# Patient Record
Sex: FEMALE | Race: BLACK OR AFRICAN AMERICAN | NOT HISPANIC OR LATINO | Employment: OTHER | URBAN - METROPOLITAN AREA
[De-identification: names, ages, dates, MRNs, and addresses within clinical notes are randomized per-mention and may not be internally consistent; named-entity substitution may affect disease eponyms.]

---

## 2022-04-28 ENCOUNTER — TELEPHONE (OUTPATIENT)
Dept: ORTHOPEDICS | Facility: CLINIC | Age: 57
End: 2022-04-28
Payer: COMMERCIAL

## 2022-04-28 NOTE — TELEPHONE ENCOUNTER
----- Message from Alcira Hector LPN sent at 4/28/2022  8:49 AM CDT -----  Good morning,     Dr Flores's last day is tomorrow with [facility]. Can you please get this pt scheduled with a provider that treats left fibula fractures.     Thanks,   Alcira  ----- Message -----  From: Lulu Bello  Sent: 4/28/2022   8:11 AM CDT  To: Sandra Mares Staff    Same Day Appointment Request    Was an appointment with another provider offered?     Reason for FST appt.:Closed fracture of distal end of left fibula, unspecified fracture pt was told by the er doctor she had to have surgery and to follow up with Dr Flores pt is in pain crying on the phone   Communication Preference: can you please call pt at [phone]  Additional Information: none    KETAN

## 2022-05-03 ENCOUNTER — HOSPITAL ENCOUNTER (OUTPATIENT)
Dept: RADIOLOGY | Facility: HOSPITAL | Age: 57
Discharge: HOME | End: 2022-05-03
Attending: PHYSICIAN ASSISTANT
Payer: COMMERCIAL

## 2022-05-03 ENCOUNTER — HOSPITAL ENCOUNTER (OUTPATIENT)
Dept: CARDIOLOGY | Facility: CLINIC | Age: 57
Discharge: HOME | End: 2022-05-03
Payer: COMMERCIAL

## 2022-05-03 ENCOUNTER — OFFICE VISIT (OUTPATIENT)
Dept: ORTHOPEDICS | Facility: CLINIC | Age: 57
End: 2022-05-03
Payer: COMMERCIAL

## 2022-05-03 VITALS — HEART RATE: 91 BPM | DIASTOLIC BLOOD PRESSURE: 97 MMHG | SYSTOLIC BLOOD PRESSURE: 143 MMHG

## 2022-05-03 DIAGNOSIS — M25.552 LEFT HIP PAIN: Primary | ICD-10-CM

## 2022-05-03 DIAGNOSIS — Z01.818 PRE-OP TESTING: ICD-10-CM

## 2022-05-03 DIAGNOSIS — S82.832A CLOSED FRACTURE OF DISTAL END OF LEFT FIBULA, UNSPECIFIED FRACTURE MORPHOLOGY, INITIAL ENCOUNTER: ICD-10-CM

## 2022-05-03 DIAGNOSIS — S82.52XA CLOSED DISPLACED FRACTURE OF MEDIAL MALLEOLUS OF LEFT TIBIA, INITIAL ENCOUNTER: ICD-10-CM

## 2022-05-03 DIAGNOSIS — M25.552 LEFT HIP PAIN: ICD-10-CM

## 2022-05-03 DIAGNOSIS — S92.322A CLOSED DISPLACED FRACTURE OF SECOND METATARSAL BONE OF LEFT FOOT, INITIAL ENCOUNTER: ICD-10-CM

## 2022-05-03 PROCEDURE — 1159F MED LIST DOCD IN RCRD: CPT | Mod: CPTII,S$GLB,, | Performed by: PHYSICIAN ASSISTANT

## 2022-05-03 PROCEDURE — 3080F PR MOST RECENT DIASTOLIC BLOOD PRESSURE >= 90 MM HG: ICD-10-PCS | Mod: CPTII,S$GLB,, | Performed by: PHYSICIAN ASSISTANT

## 2022-05-03 PROCEDURE — 73502 X-RAY EXAM HIP UNI 2-3 VIEWS: CPT | Mod: TC,LT

## 2022-05-03 PROCEDURE — 99204 OFFICE O/P NEW MOD 45 MIN: CPT | Mod: S$GLB,,, | Performed by: PHYSICIAN ASSISTANT

## 2022-05-03 PROCEDURE — 73610 X-RAY EXAM OF ANKLE: CPT | Mod: TC,LT

## 2022-05-03 PROCEDURE — 99204 PR OFFICE/OUTPT VISIT, NEW, LEVL IV, 45-59 MIN: ICD-10-PCS | Mod: S$GLB,,, | Performed by: PHYSICIAN ASSISTANT

## 2022-05-03 PROCEDURE — 93005 EKG 12-LEAD: ICD-10-PCS | Mod: S$GLB,,, | Performed by: PHYSICIAN ASSISTANT

## 2022-05-03 PROCEDURE — 73502 XR HIP WITH PELVIS WHEN PERFORMED, 2 OR 3 VIEWS LEFT: ICD-10-PCS | Mod: 26,LT,, | Performed by: RADIOLOGY

## 2022-05-03 PROCEDURE — 71046 XR CHEST PA AND LATERAL: ICD-10-PCS | Mod: 26,,, | Performed by: RADIOLOGY

## 2022-05-03 PROCEDURE — 73502 X-RAY EXAM HIP UNI 2-3 VIEWS: CPT | Mod: 26,LT,, | Performed by: RADIOLOGY

## 2022-05-03 PROCEDURE — 93010 EKG 12-LEAD: ICD-10-PCS | Mod: S$GLB,,, | Performed by: INTERNAL MEDICINE

## 2022-05-03 PROCEDURE — 71046 X-RAY EXAM CHEST 2 VIEWS: CPT | Mod: 26,,, | Performed by: RADIOLOGY

## 2022-05-03 PROCEDURE — 3077F SYST BP >= 140 MM HG: CPT | Mod: CPTII,S$GLB,, | Performed by: PHYSICIAN ASSISTANT

## 2022-05-03 PROCEDURE — 3077F PR MOST RECENT SYSTOLIC BLOOD PRESSURE >= 140 MM HG: ICD-10-PCS | Mod: CPTII,S$GLB,, | Performed by: PHYSICIAN ASSISTANT

## 2022-05-03 PROCEDURE — 99999 PR PBB SHADOW E&M-EST. PATIENT-LVL V: CPT | Mod: PBBFAC,,, | Performed by: PHYSICIAN ASSISTANT

## 2022-05-03 PROCEDURE — 3080F DIAST BP >= 90 MM HG: CPT | Mod: CPTII,S$GLB,, | Performed by: PHYSICIAN ASSISTANT

## 2022-05-03 PROCEDURE — 73610 XR ANKLE COMPLETE 3 VIEW LEFT: ICD-10-PCS | Mod: 26,LT,, | Performed by: RADIOLOGY

## 2022-05-03 PROCEDURE — 71046 X-RAY EXAM CHEST 2 VIEWS: CPT | Mod: TC,FY

## 2022-05-03 PROCEDURE — 1159F PR MEDICATION LIST DOCUMENTED IN MEDICAL RECORD: ICD-10-PCS | Mod: CPTII,S$GLB,, | Performed by: PHYSICIAN ASSISTANT

## 2022-05-03 PROCEDURE — 93005 ELECTROCARDIOGRAM TRACING: CPT | Mod: S$GLB,,, | Performed by: PHYSICIAN ASSISTANT

## 2022-05-03 PROCEDURE — 99999 PR PBB SHADOW E&M-EST. PATIENT-LVL V: ICD-10-PCS | Mod: PBBFAC,,, | Performed by: PHYSICIAN ASSISTANT

## 2022-05-03 PROCEDURE — 93010 ELECTROCARDIOGRAM REPORT: CPT | Mod: S$GLB,,, | Performed by: INTERNAL MEDICINE

## 2022-05-03 PROCEDURE — 73610 X-RAY EXAM OF ANKLE: CPT | Mod: 26,LT,, | Performed by: RADIOLOGY

## 2022-05-03 RX ORDER — OXYCODONE HYDROCHLORIDE 5 MG/1
5 TABLET ORAL EVERY 4 HOURS PRN
Qty: 42 TABLET | Refills: 0 | Status: SHIPPED | OUTPATIENT
Start: 2022-05-03 | End: 2022-05-06 | Stop reason: SDUPTHER

## 2022-05-03 RX ORDER — METHOCARBAMOL 500 MG/1
500 TABLET, FILM COATED ORAL 3 TIMES DAILY
Qty: 42 TABLET | Refills: 0 | Status: SHIPPED | OUTPATIENT
Start: 2022-05-03 | End: 2022-05-06 | Stop reason: SDUPTHER

## 2022-05-04 ENCOUNTER — HOSPITAL ENCOUNTER (OUTPATIENT)
Dept: RADIOLOGY | Facility: HOSPITAL | Age: 57
Discharge: HOME | End: 2022-05-04
Attending: PHYSICIAN ASSISTANT
Payer: COMMERCIAL

## 2022-05-04 DIAGNOSIS — S82.52XA CLOSED DISPLACED FRACTURE OF MEDIAL MALLEOLUS OF LEFT TIBIA, INITIAL ENCOUNTER: ICD-10-CM

## 2022-05-04 DIAGNOSIS — S92.322A CLOSED DISPLACED FRACTURE OF SECOND METATARSAL BONE OF LEFT FOOT, INITIAL ENCOUNTER: ICD-10-CM

## 2022-05-04 DIAGNOSIS — S82.832A CLOSED FRACTURE OF DISTAL END OF LEFT FIBULA, UNSPECIFIED FRACTURE MORPHOLOGY, INITIAL ENCOUNTER: ICD-10-CM

## 2022-05-04 PROCEDURE — 73700 CT LOWER EXTREMITY W/O DYE: CPT | Mod: 26,LT,, | Performed by: RADIOLOGY

## 2022-05-04 PROCEDURE — 73700 CT FOOT WITHOUT CONTRAST LEFT: ICD-10-PCS | Mod: 26,LT,, | Performed by: RADIOLOGY

## 2022-05-04 PROCEDURE — 73700 CT LOWER EXTREMITY W/O DYE: CPT | Mod: TC,LT

## 2022-05-06 ENCOUNTER — TELEPHONE (OUTPATIENT)
Dept: ORTHOPEDICS | Facility: CLINIC | Age: 57
End: 2022-05-06
Payer: COMMERCIAL

## 2022-05-06 ENCOUNTER — ANESTHESIA EVENT (OUTPATIENT)
Dept: SURGERY | Facility: HOSPITAL | Age: 57
End: 2022-05-06
Payer: COMMERCIAL

## 2022-05-06 ENCOUNTER — TELEPHONE (OUTPATIENT)
Dept: ORTHOPEDICS | Facility: CLINIC | Age: 57
End: 2022-05-06

## 2022-05-06 ENCOUNTER — OFFICE VISIT (OUTPATIENT)
Dept: ORTHOPEDICS | Facility: CLINIC | Age: 57
End: 2022-05-06
Payer: COMMERCIAL

## 2022-05-06 DIAGNOSIS — S82.52XA CLOSED DISPLACED FRACTURE OF MEDIAL MALLEOLUS OF LEFT TIBIA, INITIAL ENCOUNTER: ICD-10-CM

## 2022-05-06 DIAGNOSIS — S82.832A CLOSED FRACTURE OF DISTAL END OF LEFT FIBULA, UNSPECIFIED FRACTURE MORPHOLOGY, INITIAL ENCOUNTER: Primary | ICD-10-CM

## 2022-05-06 PROCEDURE — 99499 UNLISTED E&M SERVICE: CPT | Mod: S$GLB,,, | Performed by: PHYSICIAN ASSISTANT

## 2022-05-06 PROCEDURE — 1159F PR MEDICATION LIST DOCUMENTED IN MEDICAL RECORD: ICD-10-PCS | Mod: CPTII,S$GLB,, | Performed by: PHYSICIAN ASSISTANT

## 2022-05-06 PROCEDURE — 1159F MED LIST DOCD IN RCRD: CPT | Mod: CPTII,S$GLB,, | Performed by: PHYSICIAN ASSISTANT

## 2022-05-06 PROCEDURE — 99499 NO LOS: ICD-10-PCS | Mod: S$GLB,,, | Performed by: PHYSICIAN ASSISTANT

## 2022-05-06 PROCEDURE — 99999 PR PBB SHADOW E&M-EST. PATIENT-LVL II: ICD-10-PCS | Mod: PBBFAC,,, | Performed by: PHYSICIAN ASSISTANT

## 2022-05-06 PROCEDURE — 99999 PR PBB SHADOW E&M-EST. PATIENT-LVL II: CPT | Mod: PBBFAC,,, | Performed by: PHYSICIAN ASSISTANT

## 2022-05-06 RX ORDER — METHOCARBAMOL 500 MG/1
500 TABLET, FILM COATED ORAL 3 TIMES DAILY
Qty: 42 TABLET | Refills: 0 | Status: SHIPPED | OUTPATIENT
Start: 2022-05-06 | End: 2022-05-11 | Stop reason: SDUPTHER

## 2022-05-06 RX ORDER — MIDAZOLAM HYDROCHLORIDE 1 MG/ML
.5-4 INJECTION INTRAMUSCULAR; INTRAVENOUS
Status: CANCELLED | OUTPATIENT
Start: 2022-05-06

## 2022-05-06 RX ORDER — FENTANYL CITRATE 50 UG/ML
25-200 INJECTION, SOLUTION INTRAMUSCULAR; INTRAVENOUS
Status: CANCELLED | OUTPATIENT
Start: 2022-05-06

## 2022-05-06 RX ORDER — OXYCODONE HYDROCHLORIDE 5 MG/1
5 TABLET ORAL EVERY 4 HOURS PRN
Qty: 42 TABLET | Refills: 0 | Status: SHIPPED | OUTPATIENT
Start: 2022-05-06 | End: 2022-05-11 | Stop reason: SDUPTHER

## 2022-05-06 NOTE — TELEPHONE ENCOUNTER
"----- Message from Shanna Pineda sent at 5/6/2022  2:17 PM CDT -----  "Type:  Patient Call Back    Who Called:RAMYA MCGILL [[id number]]    What is the reqeust in detail:Pt is waiting at pharmacy for medication. Please advise    Can the clinic reply by [facility]?no    Best Call Back Number:[phone]      Additional Information:Please give pt an call. Pt was seen today            "

## 2022-05-06 NOTE — H&P
Subjective:      Patient ID: Sarthak Branham is a 56 y.o. female.    Chief Complaint: Pain and Injury of the Left Lower Leg, Injury and Pain of the Left Ankle, Injury and Pain of the Left Foot, and Pain and Injury of the Left Hip    Principal Orthopedic Problem: left trimal ankle fracture            Left 2,3,4 metatarsal fracture              Right ankle sprain.      Relevant Medical History: according to chart    PMHX: HTN    Lives at home with family  Prior to injury  Independent community ambulator, gait aids   Denies history of stroke, heart attack, cancer, blood clot, diabetes  Denies tobacco use  Denied chronic pain medication use prior to injury  Allergic to tylenol and hx of GI bleed 2019 need to avoid NSAID.    HPI     Patient is a 56 year old female who presented to the ED on 04/27/22 with left ankle pain after injuring her bilateral ankle.   RADS:   RIGHT: no fracture or dislocation  LEFT ANKLE: trimalleolar ankle fracture  LEFT FOOT: 2,3,4 metatarsal fracture.   Patient left ankle was treated in a posterior splint with stirup.    Past Medical History:   Diagnosis Date    Essential (primary) hypertension      Past Surgical History:   Procedure Laterality Date    KNEE SURGERY Bilateral     TUBAL LIGATION Left      Social History     Occupational History    Not on file   Tobacco Use    Smoking status: Former Smoker    Smokeless tobacco: Never Used   Substance and Sexual Activity    Alcohol use: Yes     Comment: socially    Drug use: Yes     Types: Marijuana    Sexual activity: Not on file      ROS  Current Outpatient Medications on File Prior to Visit   Medication Sig Dispense Refill    amLODIPine (NORVASC) 10 MG tablet Take 10 mg by mouth once daily.       No current facility-administered medications on file prior to visit.     Review of patient's allergies indicates:   Allergen Reactions    Penicillins Swelling    Aspirin Other (See Comments)     bleeding    Tylenol [acetaminophen] Hives          Objective:      Vitals:    05/03/22 1424   BP: (!) 143/97   BP Location: Left arm   Patient Position: Sitting   BP Method: X-Large (Automatic)   Pulse: 91     Ortho Exam     Gen: WD, WN in NAD   HEENT: NC/AT   Heart: RR   Resp: unlabored breathing   Skin: intact, no lesions pertinent to the surgery site    Assessment:       1. Left hip pain    2. Closed fracture of distal end of left fibula, unspecified fracture morphology, initial encounter    3. Closed displaced fracture of medial malleolus of left tibia, initial encounter    4. Closed displaced fracture of second metatarsal bone of left foot, initial encounter    5. Pre-op testing          Plan:       Surgical intervention per .

## 2022-05-06 NOTE — TELEPHONE ENCOUNTER
Spoke with pt. Pt is aware of Rx and when to  the prescription. Patient states verbal understanding and has no further questions.

## 2022-05-06 NOTE — PROGRESS NOTES
Subjective:      Patient ID: Sarthak Branham is a 56 y.o. female.    Chief Complaint: Pain and Injury of the Left Lower Leg, Injury and Pain of the Left Ankle, Injury and Pain of the Left Foot, and Pain and Injury of the Left Hip    Principal Orthopedic Problem: left trimal ankle fracture            Left 2,3,4 metatarsal fracture              Right ankle sprain.      Relevant Medical History: according to chart    PMHX: HTN    Lives at home with family  Prior to injury  Independent community ambulator, gait aids   Denies history of stroke, heart attack, cancer, blood clot, diabetes  Denies tobacco use  Denied chronic pain medication use prior to injury  Allergic to tylenol and hx of GI bleed 2019 need to avoid NSAID.    HPI     Patient is a 56 year old female who presented to the ED on 04/27/22 with left ankle pain after injuring her bilateral ankle.   RADS:   RIGHT: no fracture or dislocation  LEFT ANKLE: trimalleolar ankle fracture  LEFT FOOT: 2,3,4 metatarsal fracture.   Patient left ankle was treated in a posterior splint with stirup. She has been NWB. She reports pain is somewhat controlled. Denied numbness or tingling.     She reports that she is having some left hip pain also. Pain is mainly in her back area. No groin pain.     Review of Systems   Constitutional: Negative for chills and fever.   Cardiovascular: Negative for chest pain.   Respiratory: Negative for cough and shortness of breath.    Skin: Negative for color change, dry skin, itching, nail changes, poor wound healing and rash.   Musculoskeletal: Positive for falls.        Left ankle fracture   Neurological: Negative for dizziness.   Psychiatric/Behavioral: Negative for altered mental status. The patient is not nervous/anxious.    All other systems reviewed and are negative.        Objective:        General    Constitutional: She is oriented to person, place, and time. She appears well-developed and well-nourished. No distress.   HENT:   Head:  Atraumatic.   Eyes: Conjunctivae are normal.   Cardiovascular: Normal rate.    Pulmonary/Chest: Effort normal.   Neurological: She is alert and oriented to person, place, and time.   Psychiatric: She has a normal mood and affect. Her behavior is normal.         Right Ankle/Foot Exam     Comments:  Patient presented to clinic in a wheelchair with splint in place.   Moderate swelling, skin does wrinkle.         RADS: reviewed by myself:  ANKLE: There is a comminuted distal fibular fracture.  There is a fracture of the medial malleolus.  There is mild lateral subluxation of the talus.  There are fractures of the proximal 2nd 3rd and 4th metatarsals.    HIP: No fracture dislocation bone destruction seen.          Assessment:       Encounter Diagnoses   Name Primary?    Closed fracture of distal end of left fibula, unspecified fracture morphology, initial encounter     Closed displaced fracture of medial malleolus of left tibia, initial encounter     Closed displaced fracture of second metatarsal bone of left foot, initial encounter     Left hip pain Yes    Pre-op testing           Plan:       Discussed treatment options with patient.  RIGHT ANKLE: continue non-operative treatment RICE for symptoms relief.     LEFT FOOT: non operative treatment, continue splint, NWB    orderd CT : after review no concern for lis franc injury will continue splint and NWB due to ankle injury.  No operative intervention needed.    LEFT ANKLE:    Operative vs non-operative treatment discussed with patient. Recommend operative fixation. Patient agreed.  PLAN is CT for futher surgical planning., will continue splint and NWB.   Plan ORIF on 05/09/22 pending swelling.      - rest ice and elevation to reduce swelling.    Discussed proper and consistent elevation of extremities, above the level of the heart, while at rest, to help control/improve edema and decrease pain.  - NWB,   - Pain medication: refill needed, started on multimodal, but  need to avoid Tylenol and NSAIDS.    - Discussed pain medication refill policy.      - consents signed,   - lab, chest x-ray and EKG ordered  , results in chart  - not taking blood thinners       -Discussed COVID19 with the patient, they are aware of our current policies and procedures, were given the option of delaying surgery, and they elect to proceed. Covid testing to be done 48 hours prior to surgery.    - Vaccinated     Pre, sahil, and post operative procedure and expectations were discussed.  Questions were answered. The patient has been educated and is ready to proceed with surgery.  Approximately 30 minutes was spent discussing surgical outcomes, plans, procedures, pre, sahil, and post operative expectations and care. The risks, benefits and alternatives to surgery were discussed with the patient at great length.  These include bleeding, infection, vessel/nerve damage, pain, numbness, tingling, complex regional pain syndrome, hardware/surgical failure, need for further surgery, malunion, nonunion, DVT, PE, arthritis and death. He also understands that the risks of surgery may be greater for some patients due to health or lifestyle issues, such as a current condition or a history of heart disease, obesity, clotting disorders, recurrent infections, smoking, sedentary lifestyle, or noncompliance with medications, therapy, or followup. The degree of the increased risk is hard to estimate with any degree of precision.  Patient states an understanding and wishes to proceed with surgery.   All questions were answered.  No guarantees were implied or stated.  Informed consent was obtained  The patient will contact us if the have any questions, concerns, and changes in their medical condition prior to surgery.

## 2022-05-06 NOTE — TELEPHONE ENCOUNTER
Spoke with pt. Pt state that she will be late for appointment 5/6/22 @ 11:30 am. Pt is aware to come to her appointment on 5/6/22 @ 1300. Patient states verbal understanding and has no further questions.

## 2022-05-06 NOTE — PROGRESS NOTES
Patient presented to clinic for a skin check for upcomming surgery, but once splint was removed fracture blisters on both the medial and lateral sides were noted. Will postpone surgery date. return to clinic in week for skin check.

## 2022-05-09 ENCOUNTER — ANESTHESIA (OUTPATIENT)
Dept: SURGERY | Facility: HOSPITAL | Age: 57
End: 2022-05-09
Payer: COMMERCIAL

## 2022-05-10 ENCOUNTER — TELEPHONE (OUTPATIENT)
Dept: ORTHOPEDICS | Facility: CLINIC | Age: 57
End: 2022-05-10
Payer: COMMERCIAL

## 2022-05-10 NOTE — TELEPHONE ENCOUNTER
----- Message from Audrey Valadez PA-C sent at 5/10/2022 11:08 AM CDT -----  Contact: Patient    ----- Message -----  From: Radha Santiago MA  Sent: 5/10/2022  10:02 AM CDT  To: Audrey Valadez PA-C    Pt had sx on 5/9/22. Will pt next appointment be in 2wks?  ----- Message -----  From: Anai Lee  Sent: 5/10/2022   9:55 AM CDT  To: Rory Ventura Staff    Type: Patient Call Back         Who called: Patient         What is the request in detail: calling to resched pre op appt for tmrw; states her sisters appt is from 8-11:30am; wanted to see if anything later may be avail or to resched for the next best day; please advise      Best call back number: [phone]         Additional Information: prefers tmrw ; anything after 12pm           Thank You

## 2022-05-11 ENCOUNTER — HOSPITAL ENCOUNTER (OUTPATIENT)
Dept: RADIOLOGY | Facility: HOSPITAL | Age: 57
Discharge: HOME | End: 2022-05-11
Attending: PHYSICIAN ASSISTANT
Payer: COMMERCIAL

## 2022-05-11 ENCOUNTER — OFFICE VISIT (OUTPATIENT)
Dept: ORTHOPEDICS | Facility: CLINIC | Age: 57
End: 2022-05-11
Payer: COMMERCIAL

## 2022-05-11 DIAGNOSIS — S82.832A CLOSED FRACTURE OF DISTAL END OF LEFT FIBULA, UNSPECIFIED FRACTURE MORPHOLOGY, INITIAL ENCOUNTER: Primary | ICD-10-CM

## 2022-05-11 DIAGNOSIS — M25.562 ACUTE PAIN OF LEFT KNEE: ICD-10-CM

## 2022-05-11 DIAGNOSIS — S82.832A CLOSED FRACTURE OF DISTAL END OF LEFT FIBULA, UNSPECIFIED FRACTURE MORPHOLOGY, INITIAL ENCOUNTER: ICD-10-CM

## 2022-05-11 DIAGNOSIS — S82.52XA CLOSED DISPLACED FRACTURE OF MEDIAL MALLEOLUS OF LEFT TIBIA, INITIAL ENCOUNTER: ICD-10-CM

## 2022-05-11 PROCEDURE — 73562 X-RAY EXAM OF KNEE 3: CPT | Mod: 26,LT,, | Performed by: RADIOLOGY

## 2022-05-11 PROCEDURE — 99999 PR PBB SHADOW E&M-EST. PATIENT-LVL III: CPT | Mod: PBBFAC,,, | Performed by: PHYSICIAN ASSISTANT

## 2022-05-11 PROCEDURE — 73610 X-RAY EXAM OF ANKLE: CPT | Mod: 26,LT,, | Performed by: RADIOLOGY

## 2022-05-11 PROCEDURE — 99499 UNLISTED E&M SERVICE: CPT | Mod: S$GLB,,, | Performed by: PHYSICIAN ASSISTANT

## 2022-05-11 PROCEDURE — 99999 PR PBB SHADOW E&M-EST. PATIENT-LVL III: ICD-10-PCS | Mod: PBBFAC,,, | Performed by: PHYSICIAN ASSISTANT

## 2022-05-11 PROCEDURE — 1159F MED LIST DOCD IN RCRD: CPT | Mod: CPTII,S$GLB,, | Performed by: PHYSICIAN ASSISTANT

## 2022-05-11 PROCEDURE — 1159F PR MEDICATION LIST DOCUMENTED IN MEDICAL RECORD: ICD-10-PCS | Mod: CPTII,S$GLB,, | Performed by: PHYSICIAN ASSISTANT

## 2022-05-11 PROCEDURE — 73610 XR ANKLE COMPLETE 3 VIEW LEFT: ICD-10-PCS | Mod: 26,LT,, | Performed by: RADIOLOGY

## 2022-05-11 PROCEDURE — 73562 X-RAY EXAM OF KNEE 3: CPT | Mod: TC,LT

## 2022-05-11 PROCEDURE — 99499 NO LOS: ICD-10-PCS | Mod: S$GLB,,, | Performed by: PHYSICIAN ASSISTANT

## 2022-05-11 PROCEDURE — 73562 XR KNEE 3 VIEW LEFT: ICD-10-PCS | Mod: 26,LT,, | Performed by: RADIOLOGY

## 2022-05-11 PROCEDURE — 73610 X-RAY EXAM OF ANKLE: CPT | Mod: TC,LT

## 2022-05-11 RX ORDER — OXYCODONE HYDROCHLORIDE 5 MG/1
5 TABLET ORAL EVERY 4 HOURS PRN
Qty: 42 TABLET | Refills: 0 | Status: ON HOLD | OUTPATIENT
Start: 2022-05-11 | End: 2022-05-17 | Stop reason: SDUPTHER

## 2022-05-11 RX ORDER — METHOCARBAMOL 500 MG/1
500 TABLET, FILM COATED ORAL 3 TIMES DAILY
Qty: 42 TABLET | Refills: 0 | Status: SHIPPED | OUTPATIENT
Start: 2022-05-11 | End: 2022-05-25 | Stop reason: SDUPTHER

## 2022-05-17 ENCOUNTER — HOSPITAL ENCOUNTER (OUTPATIENT)
Facility: HOSPITAL | Age: 57
Discharge: HOME | End: 2022-05-17
Attending: ORTHOPAEDIC SURGERY | Admitting: ORTHOPAEDIC SURGERY
Payer: COMMERCIAL

## 2022-05-17 ENCOUNTER — OFFICE VISIT (OUTPATIENT)
Dept: ORTHOPEDICS | Facility: CLINIC | Age: 57
End: 2022-05-17
Payer: COMMERCIAL

## 2022-05-17 VITALS
OXYGEN SATURATION: 100 % | HEIGHT: 65 IN | SYSTOLIC BLOOD PRESSURE: 130 MMHG | WEIGHT: 180 LBS | DIASTOLIC BLOOD PRESSURE: 67 MMHG | BODY MASS INDEX: 29.99 KG/M2 | RESPIRATION RATE: 18 BRPM | TEMPERATURE: 98 F | HEART RATE: 77 BPM

## 2022-05-17 DIAGNOSIS — S82.832A CLOSED FRACTURE OF DISTAL END OF LEFT FIBULA, UNSPECIFIED FRACTURE MORPHOLOGY, INITIAL ENCOUNTER: Primary | ICD-10-CM

## 2022-05-17 DIAGNOSIS — S82.852A CLOSED DISPLACED TRIMALLEOLAR FRACTURE OF LEFT ANKLE: ICD-10-CM

## 2022-05-17 PROCEDURE — 25000003 PHARM REV CODE 250: Performed by: NURSE ANESTHETIST, CERTIFIED REGISTERED

## 2022-05-17 PROCEDURE — 71000016 HC POSTOP RECOV ADDL HR: Performed by: ORTHOPAEDIC SURGERY

## 2022-05-17 PROCEDURE — 99999 PR PBB SHADOW E&M-EST. PATIENT-LVL II: CPT | Mod: PBBFAC,,, | Performed by: PHYSICIAN ASSISTANT

## 2022-05-17 PROCEDURE — 64445 LEFT POPLITEAL SCIATIC SINGLE INJECTION BLOCK: ICD-10-PCS | Mod: 59,LT,, | Performed by: ANESTHESIOLOGY

## 2022-05-17 PROCEDURE — C1769 GUIDE WIRE: HCPCS | Performed by: ORTHOPAEDIC SURGERY

## 2022-05-17 PROCEDURE — 99499 NO LOS: ICD-10-PCS | Mod: S$GLB,,, | Performed by: PHYSICIAN ASSISTANT

## 2022-05-17 PROCEDURE — D9220A PRA ANESTHESIA: Mod: CRNA,,, | Performed by: NURSE ANESTHETIST, CERTIFIED REGISTERED

## 2022-05-17 PROCEDURE — 27201423 OPTIME MED/SURG SUP & DEVICES STERILE SUPPLY: Performed by: ORTHOPAEDIC SURGERY

## 2022-05-17 PROCEDURE — 64447 NJX AA&/STRD FEMORAL NRV IMG: CPT | Mod: 59,LT,, | Performed by: ANESTHESIOLOGY

## 2022-05-17 PROCEDURE — 99499 UNLISTED E&M SERVICE: CPT | Mod: S$GLB,,, | Performed by: PHYSICIAN ASSISTANT

## 2022-05-17 PROCEDURE — 99999 PR PBB SHADOW E&M-EST. PATIENT-LVL II: ICD-10-PCS | Mod: PBBFAC,,, | Performed by: PHYSICIAN ASSISTANT

## 2022-05-17 PROCEDURE — 76942 PR U/S GUIDANCE FOR NEEDLE GUIDANCE: ICD-10-PCS | Mod: 26,,, | Performed by: ANESTHESIOLOGY

## 2022-05-17 PROCEDURE — D9220A PRA ANESTHESIA: Mod: ANES,,, | Performed by: ANESTHESIOLOGY

## 2022-05-17 PROCEDURE — 64447 LEFT ADDUCTOR CANAL SINGLE INJECTION BLOCK: ICD-10-PCS | Mod: 59,LT,, | Performed by: ANESTHESIOLOGY

## 2022-05-17 PROCEDURE — 27823 TREATMENT OF ANKLE FRACTURE: CPT | Mod: LT,,, | Performed by: ORTHOPAEDIC SURGERY

## 2022-05-17 PROCEDURE — 63600175 PHARM REV CODE 636 W HCPCS: Performed by: NURSE ANESTHETIST, CERTIFIED REGISTERED

## 2022-05-17 PROCEDURE — 37000009 HC ANESTHESIA EA ADD 15 MINS: Performed by: ORTHOPAEDIC SURGERY

## 2022-05-17 PROCEDURE — 25000003 PHARM REV CODE 250: Performed by: STUDENT IN AN ORGANIZED HEALTH CARE EDUCATION/TRAINING PROGRAM

## 2022-05-17 PROCEDURE — 64445 NJX AA&/STRD SCIATIC NRV IMG: CPT | Mod: 59,LT,, | Performed by: ANESTHESIOLOGY

## 2022-05-17 PROCEDURE — D9220A PRA ANESTHESIA: ICD-10-PCS | Mod: CRNA,,, | Performed by: NURSE ANESTHETIST, CERTIFIED REGISTERED

## 2022-05-17 PROCEDURE — D9220A PRA ANESTHESIA: ICD-10-PCS | Mod: ANES,,, | Performed by: ANESTHESIOLOGY

## 2022-05-17 PROCEDURE — 71000015 HC POSTOP RECOV 1ST HR: Performed by: ORTHOPAEDIC SURGERY

## 2022-05-17 PROCEDURE — 27823 PR OPEN TX TRIMALLEOLAR ANKLE FX W FIX PST LIP: ICD-10-PCS | Mod: LT,,, | Performed by: ORTHOPAEDIC SURGERY

## 2022-05-17 PROCEDURE — 37000008 HC ANESTHESIA 1ST 15 MINUTES: Performed by: ORTHOPAEDIC SURGERY

## 2022-05-17 PROCEDURE — 36000708 HC OR TIME LEV III 1ST 15 MIN: Performed by: ORTHOPAEDIC SURGERY

## 2022-05-17 PROCEDURE — C1713 ANCHOR/SCREW BN/BN,TIS/BN: HCPCS | Performed by: ORTHOPAEDIC SURGERY

## 2022-05-17 PROCEDURE — 36000709 HC OR TIME LEV III EA ADD 15 MIN: Performed by: ORTHOPAEDIC SURGERY

## 2022-05-17 PROCEDURE — 25000003 PHARM REV CODE 250: Performed by: ANESTHESIOLOGY

## 2022-05-17 PROCEDURE — 76942 ECHO GUIDE FOR BIOPSY: CPT | Mod: 26,,, | Performed by: ANESTHESIOLOGY

## 2022-05-17 PROCEDURE — 71000033 HC RECOVERY, INTIAL HOUR: Performed by: ORTHOPAEDIC SURGERY

## 2022-05-17 PROCEDURE — 94761 N-INVAS EAR/PLS OXIMETRY MLT: CPT

## 2022-05-17 DEVICE — SCREW CORT SELF TAP 3.5X120MM: Type: IMPLANTABLE DEVICE | Site: ANKLE | Status: FUNCTIONAL

## 2022-05-17 DEVICE — IMPLANTABLE DEVICE: Type: IMPLANTABLE DEVICE | Site: ANKLE | Status: FUNCTIONAL

## 2022-05-17 RX ORDER — ACETAMINOPHEN 325 MG/1
650 TABLET ORAL EVERY 4 HOURS PRN
Status: DISCONTINUED | OUTPATIENT
Start: 2022-05-17 | End: 2022-05-17 | Stop reason: HOSPADM

## 2022-05-17 RX ORDER — PROPOFOL 10 MG/ML
VIAL (ML) INTRAVENOUS
Status: DISCONTINUED | OUTPATIENT
Start: 2022-05-17 | End: 2022-05-17

## 2022-05-17 RX ORDER — OXYCODONE HYDROCHLORIDE 5 MG/1
5 TABLET ORAL EVERY 6 HOURS PRN
Qty: 42 TABLET | Refills: 0 | Status: SHIPPED | OUTPATIENT
Start: 2022-05-17 | End: 2022-05-20 | Stop reason: SDUPTHER

## 2022-05-17 RX ORDER — LIDOCAINE HYDROCHLORIDE 10 MG/ML
1 INJECTION, SOLUTION EPIDURAL; INFILTRATION; INTRACAUDAL; PERINEURAL ONCE AS NEEDED
Status: DISCONTINUED | OUTPATIENT
Start: 2022-05-17 | End: 2022-05-17 | Stop reason: HOSPADM

## 2022-05-17 RX ORDER — HYDROMORPHONE HYDROCHLORIDE 2 MG/ML
INJECTION, SOLUTION INTRAMUSCULAR; INTRAVENOUS; SUBCUTANEOUS
Status: DISCONTINUED | OUTPATIENT
Start: 2022-05-17 | End: 2022-05-17

## 2022-05-17 RX ORDER — ACETAMINOPHEN 10 MG/ML
INJECTION, SOLUTION INTRAVENOUS
Status: DISCONTINUED | OUTPATIENT
Start: 2022-05-17 | End: 2022-05-17

## 2022-05-17 RX ORDER — ROCURONIUM BROMIDE 10 MG/ML
INJECTION, SOLUTION INTRAVENOUS
Status: DISCONTINUED | OUTPATIENT
Start: 2022-05-17 | End: 2022-05-17

## 2022-05-17 RX ORDER — ONDANSETRON 2 MG/ML
INJECTION INTRAMUSCULAR; INTRAVENOUS
Status: DISCONTINUED | OUTPATIENT
Start: 2022-05-17 | End: 2022-05-17

## 2022-05-17 RX ORDER — BUPIVACAINE HYDROCHLORIDE 2.5 MG/ML
INJECTION, SOLUTION EPIDURAL; INFILTRATION; INTRACAUDAL
Status: COMPLETED | OUTPATIENT
Start: 2022-05-17 | End: 2022-05-17

## 2022-05-17 RX ORDER — SODIUM CHLORIDE 9 MG/ML
INJECTION, SOLUTION INTRAVENOUS CONTINUOUS
Status: DISCONTINUED | OUTPATIENT
Start: 2022-05-17 | End: 2022-05-17 | Stop reason: HOSPADM

## 2022-05-17 RX ORDER — KETAMINE HCL IN 0.9 % NACL 50 MG/5 ML
SYRINGE (ML) INTRAVENOUS
Status: DISCONTINUED | OUTPATIENT
Start: 2022-05-17 | End: 2022-05-17

## 2022-05-17 RX ORDER — FENTANYL CITRATE 50 UG/ML
INJECTION, SOLUTION INTRAMUSCULAR; INTRAVENOUS
Status: DISCONTINUED | OUTPATIENT
Start: 2022-05-17 | End: 2022-05-17

## 2022-05-17 RX ORDER — ONDANSETRON 2 MG/ML
8 INJECTION INTRAMUSCULAR; INTRAVENOUS EVERY 12 HOURS PRN
Status: DISCONTINUED | OUTPATIENT
Start: 2022-05-17 | End: 2022-05-17 | Stop reason: HOSPADM

## 2022-05-17 RX ORDER — HYDROMORPHONE HYDROCHLORIDE 1 MG/ML
0.2 INJECTION, SOLUTION INTRAMUSCULAR; INTRAVENOUS; SUBCUTANEOUS EVERY 5 MIN PRN
Status: DISCONTINUED | OUTPATIENT
Start: 2022-05-17 | End: 2022-05-17 | Stop reason: HOSPADM

## 2022-05-17 RX ORDER — OXYCODONE HYDROCHLORIDE 5 MG/1
15 TABLET ORAL EVERY 4 HOURS PRN
Status: DISCONTINUED | OUTPATIENT
Start: 2022-05-17 | End: 2022-05-17 | Stop reason: HOSPADM

## 2022-05-17 RX ORDER — MIDAZOLAM HYDROCHLORIDE 1 MG/ML
INJECTION, SOLUTION INTRAMUSCULAR; INTRAVENOUS
Status: DISCONTINUED | OUTPATIENT
Start: 2022-05-17 | End: 2022-05-17

## 2022-05-17 RX ORDER — LIDOCAINE HYDROCHLORIDE 20 MG/ML
INJECTION INTRAVENOUS
Status: DISCONTINUED | OUTPATIENT
Start: 2022-05-17 | End: 2022-05-17

## 2022-05-17 RX ORDER — NEOSTIGMINE METHYLSULFATE 0.5 MG/ML
INJECTION, SOLUTION INTRAVENOUS
Status: DISCONTINUED | OUTPATIENT
Start: 2022-05-17 | End: 2022-05-17

## 2022-05-17 RX ORDER — OXYCODONE HYDROCHLORIDE 5 MG/1
5 TABLET ORAL EVERY 4 HOURS PRN
Status: DISCONTINUED | OUTPATIENT
Start: 2022-05-17 | End: 2022-05-17 | Stop reason: HOSPADM

## 2022-05-17 RX ORDER — SODIUM CHLORIDE 0.9 % (FLUSH) 0.9 %
3 SYRINGE (ML) INJECTION
Status: DISCONTINUED | OUTPATIENT
Start: 2022-05-17 | End: 2022-05-17 | Stop reason: HOSPADM

## 2022-05-17 RX ORDER — CLINDAMYCIN PHOSPHATE 900 MG/50ML
INJECTION, SOLUTION INTRAVENOUS
Status: DISCONTINUED | OUTPATIENT
Start: 2022-05-17 | End: 2022-05-17

## 2022-05-17 RX ORDER — CIPROFLOXACIN HYDROCHLORIDE 3 MG/ML
1 SOLUTION/ DROPS OPHTHALMIC EVERY 6 HOURS
Status: DISCONTINUED | OUTPATIENT
Start: 2022-05-17 | End: 2022-05-17 | Stop reason: HOSPADM

## 2022-05-17 RX ORDER — DEXAMETHASONE SODIUM PHOSPHATE 4 MG/ML
INJECTION, SOLUTION INTRA-ARTICULAR; INTRALESIONAL; INTRAMUSCULAR; INTRAVENOUS; SOFT TISSUE
Status: DISCONTINUED | OUTPATIENT
Start: 2022-05-17 | End: 2022-05-17

## 2022-05-17 RX ADMIN — BUPIVACAINE HYDROCHLORIDE 30 ML: 2.5 INJECTION, SOLUTION EPIDURAL; INFILTRATION; INTRACAUDAL; PERINEURAL at 12:05

## 2022-05-17 RX ADMIN — FENTANYL CITRATE 100 MCG: 50 INJECTION, SOLUTION INTRAMUSCULAR; INTRAVENOUS at 10:05

## 2022-05-17 RX ADMIN — MIDAZOLAM HYDROCHLORIDE 2 MG: 1 INJECTION, SOLUTION INTRAMUSCULAR; INTRAVENOUS at 10:05

## 2022-05-17 RX ADMIN — SODIUM CHLORIDE: 0.9 INJECTION, SOLUTION INTRAVENOUS at 10:05

## 2022-05-17 RX ADMIN — DEXAMETHASONE SODIUM PHOSPHATE 4 MG: 4 INJECTION, SOLUTION INTRAMUSCULAR; INTRAVENOUS at 10:05

## 2022-05-17 RX ADMIN — LIDOCAINE HYDROCHLORIDE 100 MG: 20 INJECTION, SOLUTION INTRAVENOUS at 10:05

## 2022-05-17 RX ADMIN — NEOSTIGMINE METHYLSULFATE 5 MG: 0.5 INJECTION INTRAVENOUS at 12:05

## 2022-05-17 RX ADMIN — OXYCODONE 5 MG: 5 TABLET ORAL at 03:05

## 2022-05-17 RX ADMIN — HYDROMORPHONE HYDROCHLORIDE 0.4 MG: 2 INJECTION INTRAMUSCULAR; INTRAVENOUS; SUBCUTANEOUS at 12:05

## 2022-05-17 RX ADMIN — ACETAMINOPHEN 1000 MG: 10 INJECTION, SOLUTION INTRAVENOUS at 10:05

## 2022-05-17 RX ADMIN — SODIUM CHLORIDE, SODIUM GLUCONATE, SODIUM ACETATE, POTASSIUM CHLORIDE, MAGNESIUM CHLORIDE, SODIUM PHOSPHATE, DIBASIC, AND POTASSIUM PHOSPHATE: .53; .5; .37; .037; .03; .012; .00082 INJECTION, SOLUTION INTRAVENOUS at 11:05

## 2022-05-17 RX ADMIN — GLYCOPYRROLATE 0.6 MG: 0.2 INJECTION, SOLUTION INTRAMUSCULAR; INTRAVITREAL at 12:05

## 2022-05-17 RX ADMIN — ROCURONIUM BROMIDE 50 MG: 10 INJECTION, SOLUTION INTRAVENOUS at 10:05

## 2022-05-17 RX ADMIN — ONDANSETRON HYDROCHLORIDE 4 MG: 2 INJECTION INTRAMUSCULAR; INTRAVENOUS at 10:05

## 2022-05-17 RX ADMIN — Medication 20 MG: at 10:05

## 2022-05-17 RX ADMIN — HYDROMORPHONE HYDROCHLORIDE 0.4 MG: 2 INJECTION INTRAMUSCULAR; INTRAVENOUS; SUBCUTANEOUS at 09:05

## 2022-05-17 RX ADMIN — PROPOFOL 150 MG: 10 INJECTION, EMULSION INTRAVENOUS at 10:05

## 2022-05-17 RX ADMIN — BUPIVACAINE HYDROCHLORIDE 20 ML: 2.5 INJECTION, SOLUTION EPIDURAL; INFILTRATION; INTRACAUDAL at 12:05

## 2022-05-17 RX ADMIN — CLINDAMYCIN PHOSPHATE 900 MG: 18 INJECTION, SOLUTION INTRAVENOUS at 10:05

## 2022-05-17 NOTE — PROGRESS NOTES
Pt. c/o right eye pain and irritation. Eye flushed with normal saline with no relief. Dr. Tee notified.

## 2022-05-17 NOTE — BRIEF OP NOTE
[facility] - Surgery ([facility])  Brief Operative Note    Surgery Date: 5/17/2022     Surgeon(s) and Role:     * Carlitos Walker MD - Primary     * Oleksandr Sanders MD - Resident - Assisting        Pre-op Diagnosis:  Closed fracture of distal end of left fibula, unspecified fracture morphology, initial encounter [S82.832A]  Closed displaced fracture of medial malleolus of left tibia, initial encounter [S82.52XA]    Post-op Diagnosis:  Post-Op Diagnosis Codes:     * Closed fracture of distal end of left fibula, unspecified fracture morphology, initial encounter [S82.832A]     * Closed displaced fracture of medial malleolus of left tibia, initial encounter [S82.52XA]    Procedure(s) (LRB):  ORIF, ANKLE (Left)    Anesthesia: General    Operative Findings: see op note    Estimated Blood Loss: see op note         Specimens:   Specimen (24h ago, onward)            None            Discharge Note    OUTCOME: Patient tolerated treatment/procedure well without complication and is now ready for discharge.    DISPOSITION: Home or Self Care    FINAL DIAGNOSIS:  Closed displaced trimalleolar fracture of left ankle    FOLLOWUP: In clinic    DISCHARGE INSTRUCTIONS:    Non weight bearing left leg  Keep elevated and iced frequently throughout the day  Do not get splint wet  Call clinic with questions or concerns

## 2022-05-17 NOTE — ANESTHESIA PREPROCEDURE EVALUATION
05/17/2022  Sarthak Branham is a 56 y.o., female.      Pre-op Assessment    I have reviewed the Patient Summary Reports.          Review of Systems  Anesthesia Hx:  No problems with previous Anesthesia    Social:  Non-Smoker, Former Smoker    Hematology/Oncology:  Hematology Normal   Oncology Normal     EENT/Dental:EENT/Dental Normal   Cardiovascular:   Hypertension    Pulmonary:  Pulmonary Normal    Renal/:  Renal/ Normal     Hepatic/GI:  Hepatic/GI Normal    Musculoskeletal:  Musculoskeletal Normal    Neurological:  Neurology Normal    Endocrine:  Endocrine Normal    Dermatological:  Skin Normal    Psych:  Psychiatric Normal           Physical Exam  General: Alert and Oriented    Airway:  Mallampati: II / II  Mouth Opening: Normal  TM Distance: Normal  Tongue: Normal  Neck ROM: Normal ROM    Dental:  Intact    Chest/Lungs:  Clear to auscultation, Normal Respiratory Rate    Heart:  Rate: Normal  Rhythm: Regular Rhythm  Sounds: Normal        Anesthesia Plan  Type of Anesthesia, risks & benefits discussed:    Anesthesia Type: Gen ETT, MAC, Regional  Intra-op Monitoring Plan: Standard ASA Monitors  Post Op Pain Control Plan: multimodal analgesia and peripheral nerve block  Airway Plan: Direct  Informed Consent: Informed consent signed with the Patient and all parties understand the risks and agree with anesthesia plan.  All questions answered.   ASA Score: 2    Ready For Surgery From Anesthesia Perspective.     .

## 2022-05-17 NOTE — H&P
H&P as below    56F, fall 4.27.22  L trimal ankle fx  L 2,3,4 MT base fxs    Persisent swelling has delayed surgical intervention  Currently fx blister are healing, swelling improved, but still moderate  Plan for ORIF today - likely open medial approach, possible perc fixation of lat/post mal  Discussed risks, benefits of surgery. Discussed soft tissue risk. Discussed increasing difficulty of surgical fixation w/ increased time from injury due to partial bony healing. Pt would like to proceed w/ surgery today.        Subjective:       Patient ID: Sarthak Branham is a 56 y.o. female.     Chief Complaint: Pain and Injury of the Left Lower Leg, Injury and Pain of the Left Ankle, Injury and Pain of the Left Foot, and Pain and Injury of the Left Hip     Principal Orthopedic Problem: left trimal ankle fracture                                                        Left 2,3,4 metatarsal fracture                                                          Right ankle sprain.      Relevant Medical History: according to chart     PMHX: HTN     Lives at home with family  Prior to injury  Independent community ambulator, gait aids   Denies history of stroke, heart attack, cancer, blood clot, diabetes  Denies tobacco use  Denied chronic pain medication use prior to injury  Allergic to tylenol and hx of GI bleed 2019 need to avoid NSAID.     HPI     Patient is a 56 year old female who presented to the ED on 04/27/22 with left ankle pain after injuring her bilateral ankle.   RADS:   RIGHT: no fracture or dislocation  LEFT ANKLE: trimalleolar ankle fracture  LEFT FOOT: 2,3,4 metatarsal fracture.   Patient left ankle was treated in a posterior splint with stirup.          Past Medical History:   Diagnosis Date    Essential (primary) hypertension              Past Surgical History:   Procedure Laterality Date    KNEE SURGERY Bilateral      TUBAL LIGATION Left        Social History            Occupational History    Not on file    Tobacco Use    Smoking status: Former Smoker    Smokeless tobacco: Never Used   Substance and Sexual Activity    Alcohol use: Yes       Comment: socially    Drug use: Yes       Types: Marijuana    Sexual activity: Not on file      ROS         Current Outpatient Medications on File Prior to Visit   Medication Sig Dispense Refill    amLODIPine (NORVASC) 10 MG tablet Take 10 mg by mouth once daily.          No current facility-administered medications on file prior to visit.            Review of patient's allergies indicates:   Allergen Reactions    Penicillins Swelling    Aspirin Other (See Comments)       bleeding    Tylenol [acetaminophen] Hives          Objective:   Vitals       Vitals:     05/03/22 1424   BP: (!) 143/97   BP Location: Left arm   Patient Position: Sitting   BP Method: X-Large (Automatic)   Pulse: 91         Ortho Exam     Gen: WD, WN in NAD   HEENT: NC/AT   Heart: RR   Resp: unlabored breathing   Skin: intact, no lesions pertinent to the surgery site     Assessment:       1. Left hip pain    2. Closed fracture of distal end of left fibula, unspecified fracture morphology, initial encounter    3. Closed displaced fracture of medial malleolus of left tibia, initial encounter    4. Closed displaced fracture of second metatarsal bone of left foot, initial encounter    5. Pre-op testing           Plan:       Surgical intervention per .

## 2022-05-17 NOTE — ANESTHESIA PROCEDURE NOTES
Left Adductor Canal Single Injection Block    Patient location during procedure: OR   Block not for primary anesthetic.  Reason for block: at surgeon's request and post-op pain management   Post-op Pain Location: Left leg   Start time: 5/17/2022 12:30 PM  Timeout: 5/17/2022 12:29 PM   End time: 5/17/2022 12:39 PM    Staffing  Authorizing Provider: Kobi Owen MD  Performing Provider: Rafa Patricio MD    Staffing  Other anesthesia staff: Malcolm Goode MD  Preanesthetic Checklist  Completed: patient identified, IV checked, site marked, risks and benefits discussed, surgical consent, monitors and equipment checked, pre-op evaluation and timeout performed  Peripheral Block  Patient position: supine  Prep: ChloraPrep  Patient monitoring: heart rate, cardiac monitor, continuous pulse ox, continuous capnometry and frequent blood pressure checks  Block type: adductor canal  Laterality: left  Injection technique: single shot  Needle  Needle type: Stimuplex   Needle gauge: 22 G  Needle length: 4 in  Needle localization: anatomical landmarks and ultrasound guidance   -ultrasound image captured on disc.  Assessment  Injection assessment: negative aspiration, negative parasthesia and local visualized surrounding nerve  Paresthesia pain: none  Heart rate change: no  Slow fractionated injection: yes  Pain Tolerance: comfortable throughout block and no complaints  Medications:    Medications: bupivacaine (pf) (MARCAINE) injection 0.25% - Perineural   20 mL - 5/17/2022 12:39:00 PM    Additional Notes  Prior to starting, a time out was conducted. Site kimani confirmed with team and patient. Allergies and anticoagulation status reviewed.   Vital signs stable throughout block. CRNA monitoring vitals throughout.   Needle advanced under continuous ultrasound guidance.  Local anesthetic injected incrementally after confirming negative aspiration. No signs or symptoms of intravascular or intraneural injection noted.   No  persistent paresthesias elicited or expressed. Patient tolerated procedure well.  20cc of 0.25% bupivacaine with epinephrine 1:300K used for the block.

## 2022-05-17 NOTE — OP NOTE
OPERATIVE NOTE     DATE OF PROCEDURE:  05/17/2022     PREOPERATIVE DIAGNOSIS:           Left trimalleolar fracture, closed, displaced, initial encounter.  Left foot metatarsal fracture, 2nd, 3rd, 4th closed, nondisplaced, initial encounter  Fall from standing     POSTOPERATIVE DIAGNOSIS:         Left trimalleolar fracture, closed, displaced, initial encounter.  Left foot metatarsal fracture, 2nd, 3rd, 4th closed, nondisplaced, initial encounter  Fall from standing     PROCEDURE:              Open reduction internal fixation left trimalleolar fracture with fixation of posterior lip  Closed management of left foot metatarsal fracture, 2nd, 3rd, 4th, without manipulation     SURGEON:       Carlitos Walker MD     ASSISTANT:                 Tip Sanders MD     ANESTHESIA:              General     EBL:                  10 mL     COMPLICATIONS:  None     IMPLANTS:       Christine  Lateral malleolus  3.5 mm cortical screw, x1  Medial malleolus   4.0 mm fully threaded cannulated screw, x2  Posterior malleolus   4.0 mm partially-threaded cannulated screw, x1     SPECIMENS:    None     INDICATIONS FOR PROCEDURE:  56F, fall 4.27.22  L trimal ankle fx  L 2,3,4 MT base fxs  Seen at outside hospital emergency department, splinted, discharged home    PMHX: HTN   Lives at home with family  Prior to injury  Independent community ambulator, gait aids   Denies history of stroke, heart attack, cancer, blood clot, diabetes  Denies tobacco use  Denied chronic pain medication use prior to injury  Allergic to tylenol and hx of GI bleed 2019 need to avoid NSAID.    Seen in [facility] Clinic 05/06/2022, at that time we discussed patient's injury, non operative versus operative treatment management options.  Patient is here today for operative fixation after allowing soft tissue swelling to subside.       Discussed injury, and non operative versus operative management options.  Non operative management would consist of splinting, casting,  protected weight-bearing.  Given the unstable nature of fracture, conservative management would be at higher risk for continued instability, pain, degenerative joint changes.  Discussed operative intervention the form open reduction internal fixation.  Hopefully this provides improved alignment, more stability, decreases risk of long-term degenerative changes, improve overall long-term outcome.     The risks, benefits, and alternatives to surgery were discussed with the patient and/or family.    Specific risks discussed included, but were not limited to:  Ankle pain, stiffness,  ankle arthritis, ankle instability, damage to nearby structures, including neurovascular structures leading to loss of function or loss of limb, bleeding, need for blood transfusion, pain, stiffness, scarring, numbness, tingling, weakness, compartment syndrome, malunion/nonunion, hardware failure, hardware prominence, infection, need for multiple staged procedures, prolonged antibiotics, iatrogenic fracture, heterotopic ossification, arthritis, a variety of medical complications including but not limited to heart attack, stroke, deep venous thrombosis, pulmonary embolism, prolonged hospitalization, prolonged intubation, and death.   Patient and/or family expressed an understanding and desires to proceed with surgery.   All questions were answered.  No guarantees were implied or stated.  Informed consent was obtained.        OPERATIVE PROCEDURE:  Patient met in the preoperative hold area and the correct site and side of surgery being the  left lower extremity were marked and verified.  soft tissue swelling was assessed , and there was noted to be healed fracture blisters medially laterally, still moderate swelling, appeared to be amenable to definitive surgery.   Patient brought back to the operative suite.  General anesthesia smoothly induced.  Patient transferred over to operative table.  Placed in supine position. All bony prominences were  appropriately padded.  Bump placed under operative hip, bone foam placed under operative extremity.  Patient received 900 mg clindamycin  for preoperative antibiotics.  The left lower extremity was then prepped and draped in normal sterile fashion.     Time-out was performed verifying the correct patient, site/side of surgery, surgical consent, radiographs as applicable, preop antibiotics, necessary equipment, anticipated blood loss, length of procedure, postoperative disposition.     Due the soft tissue swelling and comminuted nature of the lateral fracture, we planned for open approach to the medial ankle with fixation, followed by percutaneous fixation of posterior malleolus, and percutaneous fixation of the lateral malleolus.    Esmarch was utilized, tourniquet was inflated at 300 mm mercury for approximately 100 min during the case.     Medial approach to the ankle.  Skin was incised over the medial malleolus, curving somewhat anteriorly.  Sharply dissected down to the bone. Saphenous vein and nerve were identified, protected, retracted anteriorly as needed.  Fracture was identified, we excise some of the periosteum which was tattered and torn at the fracture edges to allow visualization of the reduction.  Dissected and visualized anterior medial shoulder.  This fracture was then booked open, hematoma, clot, debris removed, joint irrigated out.  There was some medial bone loss, and overall poor bone quality. Medial malleolus was reduced with a pointed reduction clamp.  We were able to visualize anterior medial shoulder, but due to the medial bone loss, we had to rely on radiographic imaging to gauge our reduction.  We used fluoroscopy, a point reduction clamp, and a dental pick to fine tune this reduction.  Once obtaining the reduction, placed wires for cannulated screws utilizing fluoroscopic guidance. Appropriate size 4.0 mm fully threaded cannulated screws were then placed over the wires and sequentially  tightened down. Fracture remained well reduced and compressed following screw placement.     Next we turned our attention open reduction internal fixation of the posterior malleolus. This fracture was well reduced following reduction/fixation of the medial malleolus.  We then placed a percutaneous K-wire from anterior medial to posterior lateral engaging the posterior lateral fracture fragment.  When this wire engaged the posterior cortex, it was measured.  Fluoroscopic guidance confirmed appropriate wire placement.  This wire was then drilled out lateral to the Achilles tendon along the posterior lateral aspect of the ankle out of the skin.  We made a percutaneous stab incision on the posterior lateral aspect of the ankle.  We bluntly dissected down to the posterior malleolus.  An appropriate sized partially threaded cannulated screw was then placed from posterior lateral to anterior medial securing our posterior malleolar reduction.     We then returned our attention to the lateral malleolus.  Due to the dense Woody tissue and comminuted nature of the lateral malleolus fracture, we planned for percutaneous fixation only to avoid further soft tissue trauma.  We used fluoroscopic guidance.  Made percutaneous stab incision distal tip of fibula.  Used a long 2.5 mm drill.  It into the tip of the fibula, distal aspect the fracture, and was guided across the fracture into the fibular shaft via fluoroscopy.  We then placed a 120 mm screw, 3.5 mm, through the distal fibula, across fracture in an safely into the fibular shaft, which held our reduction nicely.     We then assessed our fracture reduction hardware placement both visually and on fluoroscopic imaging were satisfied.  External rotation stress test did not indicate any syndesmotic widening.       Wounds irrigated with saline.  Hemostasis achieved as needed with electrocautery.  Deep tissue closed with 2 O Vicryl.  Subcutaneous tissue closed with 3 O Vicryl.  Skin  closed with 3 O nylon.  Xeroform, dry gauze, cast padding, a short-leg plaster splint with ankle in neutral dorsiflexion applied.     At the conclusion of procedure the patient had soft and compressible compartments, brisk cap refill, palpable DP/PT pulse in the operative extremity.     Prior to final closure all counts were confirmed to be correct.  Patient tolerated the procedure well without any complications, was awoken from anesthesia, transferred PACU for further recovery.     POSTOPERATIVE PLAN:  56F, fall 4.27.22  L trimal ankle fx  L 2,3,4 MT base fxs     05/17/2022 - ORIF left ankle fracture     Mobility for DVT prophylaxis (bleeding risk w/ ASA)  Nonweightbearing Left lower extremity x2 weeks postop.     Calcium, vitamin-D     Follow-up 2 weeks postop for removal of splint, suture removal, placement into a boot     X-rays at subsequent followups:  Left ankle     Follow-up postop 2 weeks, 6 weeks, 3 months, 6 months, 1 year     =====================  Carlitos Walker MD  Orthopaedic Surgery

## 2022-05-17 NOTE — ANESTHESIA PROCEDURE NOTES
Left Popliteal Sciatic Single Injection Block    Patient location during procedure: OR   Block not for primary anesthetic.  Reason for block: at surgeon's request and post-op pain management   Post-op Pain Location: Left leg   Start time: 5/17/2022 12:30 PM  Timeout: 5/17/2022 12:29 PM   End time: 5/17/2022 12:39 PM    Staffing  Authorizing Provider: Kobi Owen MD  Performing Provider: Rafa Patricio MD    Staffing  Other anesthesia staff: Malcolm Goode MD  Preanesthetic Checklist  Completed: patient identified, IV checked, site marked, risks and benefits discussed, surgical consent, monitors and equipment checked, pre-op evaluation and timeout performed  Peripheral Block  Patient position: supine  Prep: ChloraPrep  Patient monitoring: heart rate, cardiac monitor, continuous pulse ox, continuous capnometry and frequent blood pressure checks  Block type: popliteal  Laterality: left  Injection technique: single shot  Needle  Needle type: Stimuplex   Needle gauge: 22 G  Needle length: 4 in  Needle localization: anatomical landmarks and ultrasound guidance   -ultrasound image captured on disc.  Assessment  Injection assessment: negative aspiration, negative parasthesia and local visualized surrounding nerve  Paresthesia pain: none  Heart rate change: no  Slow fractionated injection: yes  Pain Tolerance: comfortable throughout block and no complaints  Medications:    Medications: bupivacaine (pf) (MARCAINE) injection 0.25% - Perineural, Left Popliteal   30 mL - 5/17/2022 12:33:00 PM    Additional Notes  Prior to starting, a time out was conducted. Site kimani confirmed with team and patient. Allergies and anticoagulation status reviewed.   Vital signs stable throughout block. CRNA monitoring vitals throughout.   Needle advanced under continuous ultrasound guidance.  Local anesthetic injected incrementally after confirming negative aspiration. No signs or symptoms of intravascular or intraneural injection  noted.   No persistent paresthesias elicited or expressed. Patient tolerated procedure well.  30cc of 0.25% bupivacaine with epinephrine 1:300K used for the block.

## 2022-05-17 NOTE — PATIENT INSTRUCTIONS
Non weight bearing left leg  Keep elevated and iced frequently throughout the day  Do not get splint wet  Call clinic with questions or concerns

## 2022-05-17 NOTE — NURSING TRANSFER
Nursing Transfer Note      5/17/2022     Reason patient is being transferred: Post Op    Transfer To: [facility]--slot 30    Transfer via stretcher    Transfer with : none    Transported by : RN     Medicines sent: none    Any special needs or follow-up needed:     Chart send with patient: Yes      Patient reassessed at: 5-17-22  (date, time)    Upon arrival to floor: cardiac monitor applied, patient oriented to room and bed in lowest position, receiving nurse at bedside

## 2022-05-17 NOTE — TRANSFER OF CARE
"Anesthesia Transfer of Care Note    Patient: Sarthak Branham    Procedure(s) Performed: Procedure(s) (LRB):  ORIF, ANKLE (Left)    Patient location: PACU    Anesthesia Type: general    Transport from OR: Transported from OR on 100% O2 by closed face mask with adequate spontaneous ventilation    Post pain: adequate analgesia    Post assessment: no apparent anesthetic complications    Post vital signs: stable    Level of consciousness: awake    Nausea/Vomiting: no nausea/vomiting    Complications: none    Transfer of care protocol was followed      Last vitals:   Visit Vitals  BP (!) 173/86 (BP Location: Right arm, Patient Position: Lying)   Temp 36.5 °C (97.7 °F) (Temporal)   Resp 18   Ht 5' 5" (1.651 m)   Wt 81.6 kg (180 lb)   SpO2 98%   Breastfeeding No   BMI 29.95 kg/m²     "

## 2022-05-17 NOTE — PROGRESS NOTES
Dr. Tee at . Numbing drops applied and info sheet for scratched cornea given to pt. And explained. Pt verb. Understanding.

## 2022-05-18 NOTE — ANESTHESIA POSTPROCEDURE EVALUATION
Anesthesia Post Evaluation    Patient: Sarthak Branham    Procedure(s) Performed: Procedure(s) (LRB):  ORIF, ANKLE (Left)    Final Anesthesia Type: general      Patient location during evaluation: PACU  Patient participation: Yes- Able to Participate  Level of consciousness: awake and alert  Post-procedure vital signs: reviewed and stable  Pain control: Pain has been treated.  Airway patency: patent    PONV status: PONV absent or treated.  Anesthetic complications: yes  Perioperative Events: corneal abrasion      Shaista-operative Events Comments: Postoperatively, the patient had right eye pain that was relieved by Fluress eye drops.  She was counseled and given the corneal abrasion fact sheet.  She was discharged with cipro eye drops QID for 3 days, and to call ophthalmology clinic if symptoms do not resolve or worsen.  Cardiovascular status: hemodynamically stable  Respiratory status: spontaneous ventilation  Hydration status: euvolemic  Follow-up not needed.          Vitals Value Taken Time   /67 05/17/22 1517   Temp 36.7 °C (98 °F) 05/17/22 1515   Pulse 82 05/17/22 1519   Resp 18 05/17/22 1510   SpO2 100 % 05/17/22 1519   Vitals shown include unvalidated device data.      Event Time   Out of Recovery 13:30:00         Pain/Ana Lilia Score: Pain Rating Prior to Med Admin: 4 (5/17/2022  3:10 PM)  Ana Lilia Score: 10 (5/17/2022  1:00 PM)

## 2022-05-19 ENCOUNTER — PATIENT MESSAGE (OUTPATIENT)
Dept: ORTHOPEDICS | Facility: CLINIC | Age: 57
End: 2022-05-19
Payer: COMMERCIAL

## 2022-05-20 DIAGNOSIS — S82.832D CLOSED FRACTURE OF DISTAL END OF LEFT FIBULA WITH ROUTINE HEALING, UNSPECIFIED FRACTURE MORPHOLOGY, SUBSEQUENT ENCOUNTER: Primary | ICD-10-CM

## 2022-05-20 RX ORDER — OXYCODONE HYDROCHLORIDE 5 MG/1
5 TABLET ORAL EVERY 4 HOURS PRN
Qty: 42 TABLET | Refills: 0 | Status: SHIPPED | OUTPATIENT
Start: 2022-05-20 | End: 2022-05-25 | Stop reason: SDUPTHER

## 2022-05-23 ENCOUNTER — PATIENT MESSAGE (OUTPATIENT)
Dept: ORTHOPEDICS | Facility: CLINIC | Age: 57
End: 2022-05-23
Payer: COMMERCIAL

## 2022-05-23 NOTE — PROGRESS NOTES
Patient presented to clinic for a skin check for upcomming surgery, but once splint was removed fracture blisters on both the medial and lateral sides were noted.  Patient seen by . Om to proceed with surgery today. Pateint is NPO she was referred to [facility].

## 2022-05-24 ENCOUNTER — TELEPHONE (OUTPATIENT)
Dept: ORTHOPEDICS | Facility: CLINIC | Age: 57
End: 2022-05-24
Payer: COMMERCIAL

## 2022-05-24 NOTE — TELEPHONE ENCOUNTER
----- Message from Yvonne Crystal sent at 5/24/2022  3:02 PM CDT -----  Contact: [phone]/ Self  Type: Requesting to speak with nurse    Who Called: Pt   Regarding: pt has concerns with pain on left leg    Would the patient rather a call back or a response via [employer]? Call back  Best Call Back Number: [phone]  Additional Information: Procedure was on 05/17/2022

## 2022-05-24 NOTE — TELEPHONE ENCOUNTER
Called pt twice after speaking with RR about the feeling she's having around her surgery site. Phone going straight to voicemail left msg for pt to give us a call back.

## 2022-05-25 ENCOUNTER — OFFICE VISIT (OUTPATIENT)
Dept: ORTHOPEDICS | Facility: CLINIC | Age: 57
End: 2022-05-25
Payer: COMMERCIAL

## 2022-05-25 ENCOUNTER — HOSPITAL ENCOUNTER (OUTPATIENT)
Dept: RADIOLOGY | Facility: HOSPITAL | Age: 57
Discharge: HOME | End: 2022-05-25
Attending: NURSE PRACTITIONER
Payer: COMMERCIAL

## 2022-05-25 VITALS — HEIGHT: 65 IN | BODY MASS INDEX: 29.97 KG/M2 | WEIGHT: 179.88 LBS

## 2022-05-25 DIAGNOSIS — R52 PAIN: ICD-10-CM

## 2022-05-25 DIAGNOSIS — S92.325D CLOSED NONDISPLACED FRACTURE OF SECOND METATARSAL BONE OF LEFT FOOT WITH ROUTINE HEALING, SUBSEQUENT ENCOUNTER: ICD-10-CM

## 2022-05-25 DIAGNOSIS — Z98.890 POST-OPERATIVE STATE: ICD-10-CM

## 2022-05-25 DIAGNOSIS — S92.345D CLOSED NONDISPLACED FRACTURE OF FOURTH METATARSAL BONE OF LEFT FOOT WITH ROUTINE HEALING, SUBSEQUENT ENCOUNTER: ICD-10-CM

## 2022-05-25 DIAGNOSIS — R52 PAIN: Primary | ICD-10-CM

## 2022-05-25 DIAGNOSIS — S92.335D CLOSED NONDISPLACED FRACTURE OF THIRD METATARSAL BONE OF LEFT FOOT WITH ROUTINE HEALING, SUBSEQUENT ENCOUNTER: ICD-10-CM

## 2022-05-25 DIAGNOSIS — S82.852D CLOSED DISPLACED TRIMALLEOLAR FRACTURE OF LEFT ANKLE WITH ROUTINE HEALING, SUBSEQUENT ENCOUNTER: Primary | ICD-10-CM

## 2022-05-25 PROCEDURE — 3008F PR BODY MASS INDEX (BMI) DOCUMENTED: ICD-10-PCS | Mod: CPTII,S$GLB,, | Performed by: NURSE PRACTITIONER

## 2022-05-25 PROCEDURE — 73630 X-RAY EXAM OF FOOT: CPT | Mod: TC,LT

## 2022-05-25 PROCEDURE — 73610 XR ANKLE COMPLETE 3 VIEW LEFT: ICD-10-PCS | Mod: 26,LT,, | Performed by: RADIOLOGY

## 2022-05-25 PROCEDURE — 73630 X-RAY EXAM OF FOOT: CPT | Mod: 26,LT,, | Performed by: RADIOLOGY

## 2022-05-25 PROCEDURE — 1159F PR MEDICATION LIST DOCUMENTED IN MEDICAL RECORD: ICD-10-PCS | Mod: CPTII,S$GLB,, | Performed by: NURSE PRACTITIONER

## 2022-05-25 PROCEDURE — 73630 XR FOOT COMPLETE 3 VIEW LEFT: ICD-10-PCS | Mod: 26,LT,, | Performed by: RADIOLOGY

## 2022-05-25 PROCEDURE — 1160F RVW MEDS BY RX/DR IN RCRD: CPT | Mod: CPTII,S$GLB,, | Performed by: NURSE PRACTITIONER

## 2022-05-25 PROCEDURE — 3008F BODY MASS INDEX DOCD: CPT | Mod: CPTII,S$GLB,, | Performed by: NURSE PRACTITIONER

## 2022-05-25 PROCEDURE — 99999 PR PBB SHADOW E&M-EST. PATIENT-LVL III: ICD-10-PCS | Mod: PBBFAC,,, | Performed by: NURSE PRACTITIONER

## 2022-05-25 PROCEDURE — 1160F PR REVIEW ALL MEDS BY PRESCRIBER/CLIN PHARMACIST DOCUMENTED: ICD-10-PCS | Mod: CPTII,S$GLB,, | Performed by: NURSE PRACTITIONER

## 2022-05-25 PROCEDURE — 99999 PR PBB SHADOW E&M-EST. PATIENT-LVL III: CPT | Mod: PBBFAC,,, | Performed by: NURSE PRACTITIONER

## 2022-05-25 PROCEDURE — 1159F MED LIST DOCD IN RCRD: CPT | Mod: CPTII,S$GLB,, | Performed by: NURSE PRACTITIONER

## 2022-05-25 PROCEDURE — 99024 PR POST-OP FOLLOW-UP VISIT: ICD-10-PCS | Mod: S$GLB,,, | Performed by: NURSE PRACTITIONER

## 2022-05-25 PROCEDURE — 99024 POSTOP FOLLOW-UP VISIT: CPT | Mod: S$GLB,,, | Performed by: NURSE PRACTITIONER

## 2022-05-25 PROCEDURE — 73610 X-RAY EXAM OF ANKLE: CPT | Mod: TC,LT

## 2022-05-25 PROCEDURE — 73610 X-RAY EXAM OF ANKLE: CPT | Mod: 26,LT,, | Performed by: RADIOLOGY

## 2022-05-25 RX ORDER — METHOCARBAMOL 500 MG/1
500 TABLET, FILM COATED ORAL 3 TIMES DAILY
Qty: 42 TABLET | Refills: 0 | Status: SHIPPED | OUTPATIENT
Start: 2022-05-25 | End: 2022-06-03 | Stop reason: SDUPTHER

## 2022-05-25 RX ORDER — OXYCODONE HYDROCHLORIDE 5 MG/1
5 TABLET ORAL EVERY 6 HOURS PRN
Qty: 28 TABLET | Refills: 0 | Status: SHIPPED | OUTPATIENT
Start: 2022-05-25 | End: 2022-05-31

## 2022-05-25 NOTE — PROGRESS NOTES
"Ms. Branham is here today for a post-operative visit after undergoing an ORIF for her left trimalleolar fracture with fixation of the posterior lip and closed management of her left foot metatarsal fracture (2nd, 3rd, 4th) without manipulation by Dr. Walker on 5/17/2022.    Interval History:  She reports that she is doing ok.  Patient called clinic indicating she felt something "sticking" her on the lateral side of her ankle.  She thought she felt something moving and requested an earlier appointment than her 2 week post-op visit.  Pain is ok.  She is taking pain medication.  She reports she has kept the LLE iced and elevated and avoided weight bearing activities.  She denies fever, chills, and sweats since the time of the surgery.     Physical exam:  Dressing taken down.  Incision is clean, dry and intact.  There is no signs of infection.  Incision to lateral and medial ankle is closed with sutures.  There is no drainage present.  She has dry skin present.   She has tactile stimulation to their lower leg, she denies calf pain, there is no leg edema and their pedal pulse is palpable x 2.     RADS:  X-ray of the left ankle and left foot was obtained none standing and personally reviewed by me.  Findings show fractures to distal tibia and fibula appears stable.  Hardware appears to be in good position.  2nd, 3rd, and 4th metatarsal fracture on left foot remains stable.  No new fractures seen.    Assessment:  Post-op visit (1 weeks)    Plan:    ICD-10-CM ICD-9-CM   1. Closed displaced trimalleolar fracture of left ankle with routine healing, subsequent encounter  S82.852D V54.16   2. Closed nondisplaced fracture of second metatarsal bone of left foot with routine healing, subsequent encounter  S92.325D V54.19   3. Closed nondisplaced fracture of third metatarsal bone of left foot with routine healing, subsequent encounter  S92.335D V54.19   4. Closed nondisplaced fracture of fourth metatarsal bone of left foot with " routine healing, subsequent encounter  S92.345D V54.19   5. Post-operative state  Z98.890 V45.89     Current care, treatment plan, precautions, activity level/ modifications, limitations, rehabilitation exercises and proposed future treatment were discussed with the patient. We discussed the need to monitor for changes in symptoms and condition and report them to the physician.  Discussed importance of compliance with all appointments and follow up examinations.     WOUND CARE ORDERS  - Do not remove surgical dressing for 2 weeks post-op. This will be done only by MD/CORA at initial post-op visit. If dressing is completely saturated, Call number below.   - Do not get dressings wet.   - Do not shower.   - If dressing continues to be saturated or there are signs of infection, please call Ortho Clinic [phone] for further instructions and to make appt to be seen.     Incision sites cleaned with Chlorhexidine and covered with xeroform, 4x4 and tegaderm.  Will place her into a cam boot today.      PHYSICAL THERAPY:   - Therapy is on hold.  - Weight bearing NWB until seen at 2 week post op.  - Range of motion No ROM until seen at 2 week post op.     PAIN MEDICATION:   - Pain medication: refill was needed, I will refill her OxyIR 5 mg q4h PRN.  - Pain medication refill policy provided to patient for review, yes.    - Patient was informed a multi-modal approach is used to treat their pain.     DVT PROPHYLAXIS:   - None due to history of GI bleeding.     FOLLOW UP:   - Patient will follow up in the clinic in 1 weeks.  - X-ray of her left foot and left ankle is needed.  OOB and non-standing.  - At time consider removal of sutures and advancing weight bearing.  - Future Appointments:   Future Appointments   Date Time Provider Department Center   5/25/2022  4:00 PM [facility] [id number] 485 LB LIMIT [facility] XRAYASHLEY Leigh y Ort   5/25/2022  4:05 PM [facility] [id number] 485 LB LIMIT [facility] XRAYORT Reese y Ort   5/31/2022  9:00 AM Audrey Valadez  PA-C [facility] ORTHO [facility]   6/28/2022  8:30 AM [facility] Palmer   8/9/2022 10:00 AM [facility] Palmer           If there are any questions prior to scheduled follow up, the patient was instructed to contact the office

## 2022-05-31 ENCOUNTER — OFFICE VISIT (OUTPATIENT)
Dept: ORTHOPEDICS | Facility: CLINIC | Age: 57
End: 2022-05-31
Payer: COMMERCIAL

## 2022-05-31 DIAGNOSIS — S82.852D CLOSED DISPLACED TRIMALLEOLAR FRACTURE OF LEFT ANKLE WITH ROUTINE HEALING, SUBSEQUENT ENCOUNTER: Primary | ICD-10-CM

## 2022-05-31 PROCEDURE — 99999 PR PBB SHADOW E&M-EST. PATIENT-LVL II: ICD-10-PCS | Mod: PBBFAC,,, | Performed by: PHYSICIAN ASSISTANT

## 2022-05-31 PROCEDURE — 99024 PR POST-OP FOLLOW-UP VISIT: ICD-10-PCS | Mod: S$GLB,,, | Performed by: PHYSICIAN ASSISTANT

## 2022-05-31 PROCEDURE — 99999 PR PBB SHADOW E&M-EST. PATIENT-LVL II: CPT | Mod: PBBFAC,,, | Performed by: PHYSICIAN ASSISTANT

## 2022-05-31 PROCEDURE — 99024 POSTOP FOLLOW-UP VISIT: CPT | Mod: S$GLB,,, | Performed by: PHYSICIAN ASSISTANT

## 2022-05-31 RX ORDER — TRAMADOL HYDROCHLORIDE 50 MG/1
50 TABLET ORAL
Qty: 42 TABLET | Refills: 0 | Status: SHIPPED | OUTPATIENT
Start: 2022-05-31 | End: 2022-06-09 | Stop reason: SDUPTHER

## 2022-05-31 NOTE — PROGRESS NOTES
Ms. Branham is here today for a post-operative visit after undergoing an ORIF for her left trimalleolar fracture with fixation of the posterior lip and closed management of her left foot metatarsal fracture (2nd, 3rd, 4th) without manipulation by Dr. Walker on 5/17/2022.    Interval History:  She reports that she is doing ok.     Pain is ok.  She is taking pain medication. Request refill but would like to get tramadol instead to step down.     She reports she has kept the LLE iced and elevated and avoided weight bearing activities.    She denies fever, chills, and sweats since the time of the surgery.     Physical exam:  Dressing taken down.  Incision is clean, dry and intact.  There is no signs of infection.  Sutures removed.   There is no drainage present.  She has dry skin present.   She has tactile stimulation to their lower leg, she denies calf pain, there is no leg edema and their pedal pulse is palpable x 2.     RADS:  None done today     Assessment:  Post-op visit (2 weeks)    Plan:    ICD-10-CM ICD-9-CM   1. Closed displaced trimalleolar fracture of left ankle with routine healing, subsequent encounter  S82.852D V54.16     Current care, treatment plan, precautions, activity level/ modifications, limitations, rehabilitation exercises and proposed future treatment were discussed with the patient. We discussed the need to monitor for changes in symptoms and condition and report them to the physician.  Discussed importance of compliance with all appointments and follow up examinations.     WOUND CARE ORDERS  - Do not remove surgical dressing for 2 weeks post-op. This will be done only by MD/CORA at initial post-op visit. If dressing is completely saturated, Call number below.   - Do not get dressings wet.   - Do not shower.   - If dressing continues to be saturated or there are signs of infection, please call Ortho Clinic [phone] for further instructions and to make appt to be seen.     Incision sites  cleaned with Chlorhexidine and covered with xeroform, 4x4 and tegaderm.  Will place her into a cam boot today.      PHYSICAL THERAPY:   - PT ordered .  - weight bearing as tolerated,   - ROMAT     PAIN MEDICATION:   - Pain medication: refill was needed, changed to Tramadol   - Pain medication refill policy provided to patient for review, yes.    - Patient was informed a multi-modal approach is used to treat their pain.     DVT PROPHYLAXIS:   - None due to history of GI bleeding. Will use mobility      FOLLOW UP:   - Patient will follow up in the clinic in 3-4 weeks.  - X-ray of her left foot and left ankle is needed.  OOB and non-standing.  - At time consider removal of sutures and advancing weight bearing.  - Future Appointments:   Future Appointments   Date Time Provider Department Center   6/28/2022  8:30 AM [facility] Palmer   8/9/2022 10:00 AM [facility] Palmer           If there are any questions prior to scheduled follow up, the patient was instructed to contact the office

## 2022-06-01 DIAGNOSIS — S82.852D CLOSED DISPLACED TRIMALLEOLAR FRACTURE OF LEFT ANKLE WITH ROUTINE HEALING, SUBSEQUENT ENCOUNTER: ICD-10-CM

## 2022-06-01 RX ORDER — OXYCODONE HYDROCHLORIDE 5 MG/1
5 TABLET ORAL EVERY 6 HOURS PRN
Qty: 28 TABLET | Refills: 0 | OUTPATIENT
Start: 2022-06-01

## 2022-06-03 DIAGNOSIS — S82.852D CLOSED DISPLACED TRIMALLEOLAR FRACTURE OF LEFT ANKLE WITH ROUTINE HEALING, SUBSEQUENT ENCOUNTER: ICD-10-CM

## 2022-06-03 RX ORDER — METHOCARBAMOL 500 MG/1
500 TABLET, FILM COATED ORAL 3 TIMES DAILY
Qty: 42 TABLET | Refills: 0 | Status: SHIPPED | OUTPATIENT
Start: 2022-06-03 | End: 2022-06-17

## 2022-06-06 ENCOUNTER — CLINICAL SUPPORT (OUTPATIENT)
Dept: REHABILITATION | Facility: HOSPITAL | Age: 57
End: 2022-06-06
Payer: COMMERCIAL

## 2022-06-06 DIAGNOSIS — S82.852D CLOSED DISPLACED TRIMALLEOLAR FRACTURE OF LEFT ANKLE WITH ROUTINE HEALING, SUBSEQUENT ENCOUNTER: ICD-10-CM

## 2022-06-06 PROCEDURE — 97162 PT EVAL MOD COMPLEX 30 MIN: CPT | Mod: PN

## 2022-06-06 PROCEDURE — 97110 THERAPEUTIC EXERCISES: CPT | Mod: PN

## 2022-06-06 NOTE — PROGRESS NOTES
[facility]  Physical Therapy Initial Evaluation - Ankle    Name: Sarthak Branham  Clinic Number: [id number]    Therapy Diagnosis:   Encounter Diagnosis   Name Primary?    Closed displaced trimalleolar fracture of left ankle with routine healing, subsequent encounter      Physician: Audrey Valadez, SHAWN    Physician Orders: PT Eval and Treat   Medical Diagnosis from Referral: S82.852D (ICD-10-CM) - Closed displaced trimalleolar fracture of left ankle with routine healing, subsequent encounter  Evaluation Date: 6/6/2022  Plan of Care Expiration: 8/29/22 or 24th Visit  Authorization Period Expiration: 5/31/23  Visit # / Visits authorized: 1/ 1  FOTO: Issued Visit # NP        Please see Plan of Care notation section to view Sarthak Branham Initial Evaluation.       Carlitos Phoenix, PT,DPT

## 2022-06-08 ENCOUNTER — CLINICAL SUPPORT (OUTPATIENT)
Dept: REHABILITATION | Facility: HOSPITAL | Age: 57
End: 2022-06-08
Payer: COMMERCIAL

## 2022-06-08 DIAGNOSIS — S82.852D CLOSED DISPLACED TRIMALLEOLAR FRACTURE OF LEFT ANKLE WITH ROUTINE HEALING, SUBSEQUENT ENCOUNTER: ICD-10-CM

## 2022-06-08 PROCEDURE — 97110 THERAPEUTIC EXERCISES: CPT | Mod: PN,CQ

## 2022-06-08 NOTE — PROGRESS NOTES
"Daily Physical Therapy Treatment Note    Name: Sarthak Branham  Clinic Number: [id number]    Therapy Diagnosis:   Encounter Diagnosis   Name Primary?    Closed displaced trimalleolar fracture of left ankle with routine healing, subsequent encounter      Physician: Audrey Valadez PA-C    Visit Date: 2022     Physician Orders: PT Eval and Treat   Medical Diagnosis from Referral: S82.852D (ICD-10-CM) - Closed displaced trimalleolar fracture of left ankle with routine healing, subsequent encounter  Evaluation Date: 2022  Plan of Care Expiration: 22 or 24 Visit  Authorization Period Expiration: 23  Visit # / Visits authorized:   FOTO: Issued Visit # NP  PTA Consecutive Visits #:  Visit FOTO -  10th Visit FOTO -   D/C FOTO -     Time In: 2:40  Time Out: 3:15  Billable Time: 35 minutes  Non-Billable Time: 00 minutes    Precautions: ORIF for her left trimalleolar fracture with fixation of the posterior lip and closed management of her left foot metatarsal fracture (2nd, 3rd, 4th) without manipulation by Dr. Walker on 2022.  Insurance: Payor: [employer] / Plan: BCBS ALL OUT OF STATE / Product Type: PPO /     Subjective     Pt reports: She took tramadol an hour before session so no pain but she's afraid to bear weight through left foot. Patient presented in wheel chair and soft cast.   She is compliant with home exercise program.  Response to previous treatment: 1st session    Pre-Treatment Pain Ratin/10  Post-Treatment Pain Ratin/10  Location: Left ankle    Objective     Sarthak received therapeutic exercises to develop flexibility, range of motion, strength, Endurance for 35 minutes including:    Warm-up      Supine    Ankle circles both directions- 3'  Ankle abc's - X1  Toe crunches - 3'   Long sitting HSS - 3X30"    Seated    Heel slides - 5'  DF/PF - 30 reps       Standing    Weight shifts - 3'    *Bold exercise performed       Home Exercises Provided and " Patient Education Provided     Education provided:   - Continue with HEP    Written Home Exercises Provided: Patient instructed to cont prior HEP.  Exercises were reviewed and Sarthak was able to demonstrate them prior to the end of the session.  Sarthak demonstrated fair  understanding of the education provided.     See EMR under Patient Instructions for exercises provided prior visit.    Assessment     Patient tolerated therapy well. She presented in wheel chair and with a soft cast on her foot. Patient is WBAT but afraid to bear weight through Left foot. PTA informed pt that during transfers she should attempt to put Left foot on the ground opposed to hopping on the right foot. During session movement of the left ankle and foot was very limited and as exercises prolonged pt began to feel pain on the medial aspect of the foot. Luis horses were frequent and painful. Moderate cueing used in today's session to improve neuromuscular control during toe flexion. Exercises performed on the Right foot at same time as a visual cue. Sarthak had no adverse effects with exercises today and will continue to benefit from skilled therapy.     Sarthak is progressing well towards her goals.     Pt prognosis is Good.     Pt will continue to benefit from skilled outpatient physical therapy to address the deficits listed in the problem list box on initial evaluation, provide pt/family education and to maximize pt's level of independence in the home and community environment.     Pt's spiritual, cultural and educational needs considered and pt agreeable to plan of care and goals.    Anticipated barriers to physical therapy: None identified at eval    Goals:  Short Term Goals: 6 weeks        Goal Status    1. Pt. to report decreased left ankle pain </ = 6/10 at worst to increase tolerance for WB  Progressing 6/8/2022     2. Pt. to demonstrate increased left  ankle gastrocnemius flexibility to -3 degrees to improve ease with stair  descent. Progressing 6/8/2022      3. Pt to demonstrate increased MMT for left  ankle Inversion  and Eversion  to 3/5 to increase ankle stability during lateral motions. Progressing 6/8/2022      4. Pt. demonstrate increased MMT for left  ankle plantarflexion to 3+/5 to increase ease with toe clearance Progressing 6/8/2022      5. Pt to be Independent with HEP to improve ROM and independence with ADL's Progressing 6/8/2022                                   Long Term Goals: 12 weeks        Goal   Status   1. Pt. to report decreased left ankle pain </ = 3/10 at worst to increase tolerance for ambulation     2. Pt. to demonstrate increased left  ankle gastrocnemius flexibility to 5 degrees to improve ease with stair descent.     3. Pt to demonstrate increased MMT for left  ankle Inversion  and Eversion  to 4-/5 to increase ankle stability during lateral motions.     4. Pt. demonstrate increased MMT for left  ankle dorsiflexion to 4-/5 to increase ease with toe clearance     5. Pt to be Independent with HEP to improve ROM and independence with ADL's          Plan     Continued with current POC      Sergio Mendoza PTA ,  6/8/2022

## 2022-06-09 DIAGNOSIS — S82.852D CLOSED DISPLACED TRIMALLEOLAR FRACTURE OF LEFT ANKLE WITH ROUTINE HEALING, SUBSEQUENT ENCOUNTER: ICD-10-CM

## 2022-06-10 RX ORDER — TRAMADOL HYDROCHLORIDE 50 MG/1
50 TABLET ORAL EVERY 6 HOURS PRN
Qty: 28 TABLET | Refills: 0 | Status: SHIPPED | OUTPATIENT
Start: 2022-06-10 | End: 2022-06-17

## 2022-06-13 ENCOUNTER — CLINICAL SUPPORT (OUTPATIENT)
Dept: REHABILITATION | Facility: HOSPITAL | Age: 57
End: 2022-06-13
Payer: COMMERCIAL

## 2022-06-13 DIAGNOSIS — S82.852D CLOSED DISPLACED TRIMALLEOLAR FRACTURE OF LEFT ANKLE WITH ROUTINE HEALING, SUBSEQUENT ENCOUNTER: ICD-10-CM

## 2022-06-13 PROCEDURE — 97112 NEUROMUSCULAR REEDUCATION: CPT | Mod: PN,CQ

## 2022-06-13 PROCEDURE — 97110 THERAPEUTIC EXERCISES: CPT | Mod: PN,CQ

## 2022-06-13 NOTE — PROGRESS NOTES
"Daily Physical Therapy Treatment Note    Name: Sarthak Branham  Clinic Number: [id number]    Therapy Diagnosis:   Encounter Diagnosis   Name Primary?    Closed displaced trimalleolar fracture of left ankle with routine healing, subsequent encounter      Physician: Audrey Valadez PA-C    Visit Date: 2022     Physician Orders: PT Eval and Treat   Medical Diagnosis from Referral: S82.852D (ICD-10-CM) - Closed displaced trimalleolar fracture of left ankle with routine healing, subsequent encounter  Evaluation Date: 2022  Plan of Care Expiration: 22 or  Visit  Authorization Period Expiration: 23  Visit # / Visits authorized:   FOTO: Issued Visit # NP  PTA Consecutive Visits #:  Visit FOTO -  10th Visit FOTO -   D/C FOTO -     Time In: 1:38pm  Time Out: 2:30pm  Billable Time: 50 minutes  Non-Billable Time: 00 minutes    Precautions: ORIF for her left trimalleolar fracture with fixation of the posterior lip and closed management of her left foot metatarsal fracture (2nd, 3rd, 4th) without manipulation by Dr. Walker on 2022.  Insurance: Payor: [employer] / Plan: BCBS ALL OUT OF STATE / Product Type: PPO /     Subjective     Pt reports: Pain medication taken before session    She is compliant with home exercise program.  Response to previous treatment: Patient was sore the rest of the day after previous session but soreness was gone by next day    Pre-Treatment Pain Ratin/10  Post-Treatment Pain Ratin/10  Location: Left ankle    Objective     Sarthak received therapeutic exercises to develop flexibility, range of motion, strength, Endurance for 42 minutes including:    Warm-up      Supine    Ankle circles CC/CW- 3'  Ankle abc's - X1  Toe crunches - 3'   Long sitting HSS - 4X30"    Seated    Heel slides - 5'  DF/PF on wobble board - 5'  Long Arc Quad with dorsiflexion  Hip adduction 10 x 3      Standing     Weight shifts-3'    Sarthak participated in " neuromuscular re-education activities to improve: activation for muscles in the ankle and foot and balance for 8 minutes. The following activities were included:  Lateral Weight shifts - 5'  Pre-gait weight shifts - 5' (attempted but challenging due to pt's reluctance to bear weight on Left foot. )      *Bold exercise performed       Home Exercises Provided and Patient Education Provided     Education provided:   - Continue with HEP    Written Home Exercises Provided: Patient instructed to cont prior HEP.  Exercises were reviewed and Sarthak was able to demonstrate them prior to the end of the session.  Sarthak demonstrated fair  understanding of the education provided.     See EMR under Patient Instructions for exercises provided prior visit.    Assessment     Patient tolerated therapy well. Wobble board added to assist pt in dorsi flexion and plantar flexion. She tolerated well without adverse effects, stated her ankle felt stiffer after exercise. Ankle range of motion is still very limited but pt showed improved movement with ankle circles. Long Arc Quad with dorsiflexion added to strengthen quadriceps and anterior tib. Lateral and pre-gait Weight shifts used to improve pt balance and activation of muscles in the foot and ankle. Pre-gait weight shifts too challenging due to pt's hesitancy to mimic mid stance with verbal and tactile cues, exercise was not completed. Patient tolerated all exercises without adverse effects and will continue to benefit from skilled therapy.     Sarthak is progressing well towards her goals.     Pt prognosis is Good.     Pt will continue to benefit from skilled outpatient physical therapy to address the deficits listed in the problem list box on initial evaluation, provide pt/family education and to maximize pt's level of independence in the home and community environment.     Pt's spiritual, cultural and educational needs considered and pt agreeable to plan of care and  goals.    Anticipated barriers to physical therapy: None identified at eval    Goals:  Short Term Goals: 6 weeks        Goal Status    1. Pt. to report decreased left ankle pain </ = 6/10 at worst to increase tolerance for WB  Progressing 6/13/2022     2. Pt. to demonstrate increased left  ankle gastrocnemius flexibility to -3 degrees to improve ease with stair descent. Progressing 6/13/2022      3. Pt to demonstrate increased MMT for left  ankle Inversion  and Eversion  to 3/5 to increase ankle stability during lateral motions. Progressing 6/13/2022      4. Pt. demonstrate increased MMT for left  ankle plantarflexion to 3+/5 to increase ease with toe clearance Progressing 6/13/2022      5. Pt to be Independent with HEP to improve ROM and independence with ADL's Progressing 6/13/2022                                   Long Term Goals: 12 weeks        Goal   Status   1. Pt. to report decreased left ankle pain </ = 3/10 at worst to increase tolerance for ambulation     2. Pt. to demonstrate increased left  ankle gastrocnemius flexibility to 5 degrees to improve ease with stair descent.     3. Pt to demonstrate increased MMT for left  ankle Inversion  and Eversion  to 4-/5 to increase ankle stability during lateral motions.     4. Pt. demonstrate increased MMT for left  ankle dorsiflexion to 4-/5 to increase ease with toe clearance     5. Pt to be Independent with HEP to improve ROM and independence with ADL's          Plan     Continued with current POC      Sergio Mendoza PTA ,  6/13/2022

## 2022-06-13 NOTE — PLAN OF CARE
[facility]  Physical Therapy Initial Evaluation - Ankle    Name: Sarthak Branham  Clinic Number: [id number]    Therapy Diagnosis:   Encounter Diagnosis   Name Primary?    Closed displaced trimalleolar fracture of left ankle with routine healing, subsequent encounter      Physician: Audrey Valadez PA-C    Physician Orders: PT Eval and Treat   Medical Diagnosis from Referral: S82.852D (ICD-10-CM) - Closed displaced trimalleolar fracture of left ankle with routine healing, subsequent encounter  Evaluation Date: 6/6/2022  Plan of Care Expiration: 8/29/22 or 24th Visit  Authorization Period Expiration: 5/31/23  Visit # / Visits authorized: 1/ 1  FOTO: Issued Visit # NP    Time In: 1303  Time Out: 1355  Total Billable Time: 52 minutes    Precautions: ORIF for her left trimalleolar fracture with fixation of the posterior lip and closed management of her left foot metatarsal fracture (2nd, 3rd, 4th) without manipulation by Dr. Walker on 5/17/2022.    weight bearing as tolerated in boot, ROMAT    Subjective     History of current condition - Sarthak is a 56 y.o. year old female who presents to the [facility] PT clinic  with complaint of left ankle pain following fall. Patient reports falling from steps with an increase in pain and swelling that lead surgical intervention. Pt report onset of the symptoms occurred  6 weeks  prior to evaluation with surgical intervention performed 2 weeks prior (5/17/22). Precipitating event:Insidious Onset . Current symptoms include: pain, selling and decreased ROM. Aggravating factors: standing.   Patients presently rates pain 2/10 on pain scale.     Mechanism of Injury: fall from step  Next MD Visit: TBD     Medical History:   Past Medical History:   Diagnosis Date    Essential (primary) hypertension        Surgical History:   Sarthak Branham  has a past surgical history that includes Tubal ligation (Left); Knee surgery (Bilateral); and Open reduction and  internal fixation (ORIF) of injury of ankle (Left, 5/17/2022).    Medications:   Sarthak has a current medication list which includes the following prescription(s): amlodipine, methocarbamol, and tramadol.    Allergies:   Review of patient's allergies indicates:   Allergen Reactions    Penicillins Swelling    Aspirin Other (See Comments)     bleeding    Tylenol [acetaminophen] Hives        Imaging: See Imaging Report    Prior Therapy: No prior therapy received for current condition   Social History: Sarthak lives in a single story home with 2 steps to enter and  lives with their family  Assistive Devices Owned: walker and wheelchair   Occupation: Desk job   Hobbies/Exercise: running (walking/jogging 10 miles with intervals)  Hand Dominance: right  Prior Level of Function: Independent  Current Level of Function: Modified Independent secondary to B ankle pain     Pain:  Current 2/10, worst 8/10 (night time increases pain), best 2/10 (medication reduces pain)  Location: left ankle  Description: Aching, Burning, Throbbing, Tight, Tingling, Sharp, Electric, Shooting   Aggravating Factors: Sitting, Standing, Walking and Night Time  Easing Factors: pain medication    Pts goals: run/walking     Objective     Posture: Poor  Palpation: mild generalized muscle tenderness  Sensation: tingling above the 2-4 tarsal   DTRs: 2+    Range of Motion/Strength:     Ankle  Left Right Pain/Dysfunction with Movement   AROM      Ankle Dorsiflexion with knee flexed (20)  -10°   5°     Ankle Plantarflexion (50)  -10 to 30°    45°     Ankle Inversion (35)  3°   40°     Ankle Eversion  (25)  0°   10°     Great toe flexion (50)  20°   0°     Great toe extension (60)  10°   50°           RLE 5/5 4+/5 4/5 4-/5 3+/5 3/5 3-/5 2+/5 2/5 2-/5 1/5 0 NT   Hip Flexion    x                 Hip Extension    x                 Hip Abduction    x                 Hip Adduction    x                 Hip Internal Rotation    x                 Hip External  "Rotation    x                 Knee Flexion    x                 Knee Extension    x                 Ankle Dorsiflexion                  x   Ankle Plantarflexion                  x   Ankle Inversion             x   Ankle Eversion              x      LLE 5/5 4+/5 4/5 4-/5 3+/5 3/5 3-/5 2+/5 2/5 2-/5 1/5 0   Hip Flexion      x                   Hip Extension      x                   Hip Abduction      x                   Hip Adduction      x                   Hip Internal Rotation      x                   Hip External Rotation      x                   Knee Flexion      x                   Knee Extension      x                   Ankle Dorsiflexion        x                 Ankle Plantarflexion        x                 Ankle Inversion      x          Ankle Eversion      x               Special Tests Left Right Comments   Flexibility       Girth Measurements       Figure 8  55cm  54cm       Gait Analysis   Sarthak ambulated 10 feet with rolling walker device with independence assistance. Sarthak displays decreased stride and weight bearing tolerance gait deviation.     Other:   Sit to Stand - 0 Reps. Completed in 30 sec.  TUG -  >14 Sec. To complete 10ft. (> 14sec. Considered a fall risk)       CMS Impairment/Limitation/Restriction for FOTO Ankle Survey    Therapist reviewed FOTO scores for Sarthak Branham on 6/6/2022.   FOTO documents entered into [employer] - see Media section.    Limitation Score: See Media Section   Category: Mobility           TREATMENT   Treatment Time In: 1:30  Treatment Time Out: 1:55  Total Treatment time separate from Evaluation: 25 minutes    Sarthak received therapeutic exercises to develop endurance, ROM and posture for 25 minutes including:  Warm-up      Supine    Ankle circles - 30 reps  Ankle abc's - X1  Toe crunches - 30 repititions      Seated    Heel slides - 30 reps   DF/PF - 30 reps     HSS - 3X30"    Standing    Weight shifts - 3'    Home Exercises and Patient Education " Provided    Education provided:   Patient was provided educational information regarding: role of Physical Therapy, short and long term goals, patient/therapist expectations, scheduling, and attendance policy.    Written Home Exercises Provided: yes  Exercises were reviewed and Sarthak was able to demonstrate them prior to the end of the session.  Sarthak demonstrated fair  understanding of the education provided.     See EMR under: Patient Instructions for exercises provided 6/6/2022.    Assessment     Sarthak is a 56 y.o. female referred to outpatient Physical Therapy with a medical diagnosis of  Closed displaced trimalleolar fracture of left ankle with routine healing, subsequent encounter. Pt presents with displays of weakness, impaired endurance, impaired functional mobility, gait instability, impaired balance, decreased lower extremity function, pain and decreased ROM. Patient currently presents to therapy following surgical intervention secondary to a fall. Patient reports in w/c and non-ambulatory secondary to non-weight bearing. Patient was however able to perform TTWB to 50% weight bearing with CAM boot in place. Patient displays decreased strength, lack of range of motion and lack of tolerance for ambulation secondary to surgical intervention. Patient educated in regards to icing and elevation. Patient will benefit from skilled therapy to address current deficits.     Pt prognosis is Good.   Pt will benefit from skilled outpatient Physical Therapy to address the deficits stated above and in the chart below, provide pt/family education, and to maximize pt's level of independence.     Plan of care discussed with patient: Yes  Pt's spiritual, cultural and educational needs considered and patient is agreeable to the plan of care and goals as stated below:     Anticipated Barriers for therapy: None Identified during initial evaluation     Medical Necessity is demonstrated by the  following  History  Co-morbidities and personal factors that may impact the plan of care Co-morbidities:     Past Medical History:   Diagnosis Date    Essential (primary) hypertension        Personal Factors:   no deficits     moderate   Examination  Body Structures and Functions, activity limitations and participation restrictions that may impact the plan of care Body Regions:   lower extremities    Body Systems:    ROM  strength  balance  gait    Participation Restrictions:   WBAT    Activity limitations:   Learning and applying knowledge  no deficits    General Tasks and Commands  no deficits    Communication  no deficits    Mobility  walking  moving around using equipment (WC)    Self care  no deficits    Domestic Life  doing house work (cleaning house, washing dishes, laundry)    Interactions/Relationships  no deficits    Life Areas  no deficits    Community and Social Life  no deficits         moderate   Clinical Presentation stable and uncomplicated moderate   Decision Making/ Complexity Score: moderate     Goals:  Short Term Goals: 6 weeks      Goal Status    1. Pt. to report decreased left ankle pain </ = 6/10 at worst to increase tolerance for WB    2. Pt. to demonstrate increased left  ankle gastrocnemius flexibility to -3 degrees to improve ease with stair descent.    3. Pt to demonstrate increased MMT for left  ankle Inversion  and Eversion  to 3/5 to increase ankle stability during lateral motions.    4. Pt. demonstrate increased MMT for left  ankle plantarflexion to 3+/5 to increase ease with toe clearance    5. Pt to be Independent with HEP to improve ROM and independence with ADL's          Long Term Goals: 12 weeks      Goal   Status   1. Pt. to report decreased left ankle pain </ = 3/10 at worst to increase tolerance for ambulation    2. Pt. to demonstrate increased left  ankle gastrocnemius flexibility to 5 degrees to improve ease with stair descent.    3. Pt to demonstrate increased MMT for  left  ankle Inversion  and Eversion  to 4-/5 to increase ankle stability during lateral motions.    4. Pt. demonstrate increased MMT for left  ankle dorsiflexion to 4-/5 to increase ease with toe clearance    5. Pt to be Independent with HEP to improve ROM and independence with ADL's        Plan   Plan of care Certification: 6/6/2022 to8/29/2022 (12)      Outpatient Physical Therapy 2 times weekly for 12 weeks to include the following interventions: Gait Training, Manual Therapy, Neuromuscular Re-ed, Orthotic Management and Training, Patient Education, Therapeutic Activities, Therapeutic Exercise, Ultrasound and IDN .     Carlitos Phoenix, PT,DPT

## 2022-06-15 ENCOUNTER — CLINICAL SUPPORT (OUTPATIENT)
Dept: REHABILITATION | Facility: HOSPITAL | Age: 57
End: 2022-06-15
Payer: COMMERCIAL

## 2022-06-15 DIAGNOSIS — S82.852D CLOSED DISPLACED TRIMALLEOLAR FRACTURE OF LEFT ANKLE WITH ROUTINE HEALING, SUBSEQUENT ENCOUNTER: ICD-10-CM

## 2022-06-15 PROCEDURE — 97110 THERAPEUTIC EXERCISES: CPT | Mod: PN,CQ

## 2022-06-15 NOTE — PROGRESS NOTES
"Daily Physical Therapy Treatment Note    Name: Sarthak Branham  Clinic Number: [id number]    Therapy Diagnosis:   Encounter Diagnosis   Name Primary?    Closed displaced trimalleolar fracture of left ankle with routine healing, subsequent encounter      Physician: Audrey Valadez PA-C    Visit Date: 6/15/2022     Physician Orders: PT Eval and Treat   Medical Diagnosis from Referral: S82.852D (ICD-10-CM) - Closed displaced trimalleolar fracture of left ankle with routine healing, subsequent encounter  Evaluation Date: 2022  Plan of Care Expiration: 22 or  Visit  Authorization Period Expiration: 23  Visit # / Visits authorized: 3/ 20  FOTO: Issued Visit # NP  PTA Consecutive Visits #: 3/6     5th Visit FOTO -   Visit FOTO -   D/C FOTO -     Time In: 3:26 pm  Time Out: 4:00 pm  Billable Time: 34 minutes  Non-Billable Time: 00 minutes    Precautions: ORIF for her left trimalleolar fracture with fixation of the posterior lip and closed management of her left foot metatarsal fracture (2nd, 3rd, 4th) without manipulation by Dr. Walker on 2022.  Insurance: Payor: [employer] / Plan: BCBS ALL OUT OF STATE / Product Type: PPO /     Subjective     Pt reports: Pain medication taken before session. Pain increased and pt reports swelling after last session    She is compliant with home exercise program.  Response to previous treatment: Patient was sore the rest of the day after previous session but soreness was gone by next day    Pre-Treatment Pain Ratin/10  Post-Treatment Pain Ratin/10  Location: Left ankle    Objective     Sarthak received therapeutic exercises to develop flexibility, range of motion, strength, Endurance for 34 minutes including:    Warm-up      Supine    Ankle circles CC/CW- 3'  Ankle abc's - X1  Toe crunches - 3'   Long sitting HSS - 4X30"    Seated    Heel slides - 5'  DF/PF on wobble board - 5'  Long Arc Quad with dorsiflexion  Hip adduction 10 x " 3      Standing     Weight shifts-3'    Sarthak participated in neuromuscular re-education activities to improve: activation for muscles in the ankle and foot and balance for 0 minutes. The following activities were included:  Lateral Weight shifts - 5'  Pre-gait weight shifts - 5' (attempted but challenging due to pt's reluctance to bear weight on Left foot. )      *Bold exercise performed       Home Exercises Provided and Patient Education Provided     Education provided:   - Continue with home exercise program ; ice ankle to reduce pain and elevate    Written Home Exercises Provided: Patient instructed to cont prior HEP.  Exercises were reviewed and Sarthak was able to demonstrate them prior to the end of the session.  Sarthak demonstrated fair  understanding of the education provided.     See EMR under Patient Instructions for exercises provided prior visit.    Assessment     Patient tolerated therapy fairly well and is 4 weeks post-op. She presented from increased pain and some swelling from last session. Weight bearing exercises not performed to avoid an increase in inflammation. Patient was challenged with exercises today due to increased pain but completed all repetitions. Sarthak will continue to benefit from skilled therapy.     Sarthak is progressing well towards her goals.     Pt prognosis is Good.     Pt will continue to benefit from skilled outpatient physical therapy to address the deficits listed in the problem list box on initial evaluation, provide pt/family education and to maximize pt's level of independence in the home and community environment.     Pt's spiritual, cultural and educational needs considered and pt agreeable to plan of care and goals.    Anticipated barriers to physical therapy: None identified at eval    Goals:  Short Term Goals: 6 weeks        Goal Status    1. Pt. to report decreased left ankle pain </ = 6/10 at worst to increase tolerance for WB  Progressing 6/15/2022      2. Pt. to demonstrate increased left  ankle gastrocnemius flexibility to -3 degrees to improve ease with stair descent. Progressing 6/15/2022      3. Pt to demonstrate increased MMT for left  ankle Inversion  and Eversion  to 3/5 to increase ankle stability during lateral motions. Progressing 6/15/2022      4. Pt. demonstrate increased MMT for left  ankle plantarflexion to 3+/5 to increase ease with toe clearance Progressing 6/15/2022      5. Pt to be Independent with HEP to improve ROM and independence with ADL's Progressing 6/15/2022                                   Long Term Goals: 12 weeks        Goal   Status   1. Pt. to report decreased left ankle pain </ = 3/10 at worst to increase tolerance for ambulation     2. Pt. to demonstrate increased left  ankle gastrocnemius flexibility to 5 degrees to improve ease with stair descent.     3. Pt to demonstrate increased MMT for left  ankle Inversion  and Eversion  to 4-/5 to increase ankle stability during lateral motions.     4. Pt. demonstrate increased MMT for left  ankle dorsiflexion to 4-/5 to increase ease with toe clearance     5. Pt to be Independent with HEP to improve ROM and independence with ADL's          Plan     Continued with current POC      Sergio Mendoza PTA ,  6/15/2022

## 2022-06-24 ENCOUNTER — PATIENT MESSAGE (OUTPATIENT)
Dept: ADMINISTRATIVE | Facility: OTHER | Age: 57
End: 2022-06-24
Payer: COMMERCIAL

## 2022-06-27 ENCOUNTER — CLINICAL SUPPORT (OUTPATIENT)
Dept: REHABILITATION | Facility: HOSPITAL | Age: 57
End: 2022-06-27
Payer: COMMERCIAL

## 2022-06-27 DIAGNOSIS — M25.572 ACUTE LEFT ANKLE PAIN: ICD-10-CM

## 2022-06-27 DIAGNOSIS — S82.852D CLOSED DISPLACED TRIMALLEOLAR FRACTURE OF LEFT ANKLE WITH ROUTINE HEALING, SUBSEQUENT ENCOUNTER: Primary | ICD-10-CM

## 2022-06-27 DIAGNOSIS — M25.672 ANKLE STIFFNESS, LEFT: ICD-10-CM

## 2022-06-27 PROCEDURE — 97110 THERAPEUTIC EXERCISES: CPT | Mod: PN

## 2022-06-27 NOTE — PROGRESS NOTES
"Daily Physical Therapy Treatment Note    Name: Sarthak Branham  Clinic Number: [id number]    Therapy Diagnosis:   Encounter Diagnoses   Name Primary?    Closed displaced trimalleolar fracture of left ankle with routine healing, subsequent encounter Yes    Acute left ankle pain     Ankle stiffness, left         Physician: Audrey Valadez PA-C  Visit Date: 2022     Physician Orders: PT Eval and Treat   Medical Diagnosis from Referral: S82.852D (ICD-10-CM) - Closed displaced trimalleolar fracture of left ankle with routine healing, subsequent encounter  Evaluation Date: 2022  Plan of Care Expiration: 22 or  Visit  Authorization Period Expiration: 23  Visit # / Visits authorized:   FOTO: Issued Visit # NP  PTA Consecutive Visits #:  Visit FOTO -   Visit FOTO -   D/C FOTO -     Time In: 3:23 pm  Time Out: 4:02 pm  Billable Time: 39 minutes   Non-Billable Time: 00 minutes    DATE OF PROCEDURE:  2022    6 weeks - 2022  8 weeks - 2022    PROCEDURE:              Open reduction internal fixation left trimalleolar fracture with fixation of posterior lip  Closed management of left foot metatarsal fracture, 2nd, 3rd, 4th, without manipulation    Insurance: Payor: [employer] / Plan: BCBS ALL OUT OF STATE / Product Type: PPO /     Subjective     Pt reports: today was the first day she tried walking in the CAM boot. Patient mentions she was using the Left knee  up till today.    She is compliant with home exercise program.    Response to previous treatment: "Good. I was a little sore. "  Pre-Treatment Pain Ratin/10  Post-Treatment Pain Ratin/10  Location: Left ankle    Objective     Sarthak received therapeutic exercises to develop flexibility, range of motion, strength, Endurance for 39 minutes including:    Semi - Supine    Ankle circles CC/CW- 3 minutes  Ankle abc's - X1  Toe crunches - 3 minutes  Long sitting Hamstring stretch - 4X30 " seconds  Ankle plantarflexion with red theraband, 3x10  Ankle dorsiflexion with red theraband, 3x10    Seated    Heel slides - 5 minutes  Ankle DF/PF on wobble board, 3 minutes   Ankle inversion / eversion on wobble board, 3 minutes   Long Arc Quad with dorsiflexion, 3x10  Hip adduction 10 x 3      Standing     Weight shifts-3'    Sarthak participated in neuromuscular re-education activities to improve: activation for muscles in the ankle and foot and balance for 0 minutes. The following activities were included:    Lateral Weight shifts - 5'  Pre-gait weight shifts - 5' (attempted but challenging due to pt's reluctance to bear weight on Left foot. )      *Bold exercise performed       Home Exercises Provided and Patient Education Provided     Education provided:   - Continue with home exercise program ; ice ankle to reduce pain and elevate    Written Home Exercises Provided: Patient instructed to cont prior HEP.  Exercises were reviewed and Sarthak was able to demonstrate them prior to the end of the session.  Sarthak demonstrated fair  understanding of the education provided.     See EMR under Patient Instructions for exercises provided prior visit.    Assessment     Patient will be 6 weeks post-op ORIF of Left ankle on 6/28/2022. Pt required an increase in verbal and tactile cueing during today's treatment session for proper form and pacing of exercises.  Pt tolerated treatment session good  and will continue to benefit from skilled therapy to address deficits.  Pt performed today's therapeutic interventions without adverse events.  Pt educated to continue HEP to improve progression.      Instructed the patient to use a single point cane when ambulating due to the instability of her current gait pattern. Reminded patient to manage swelling with icing and elevated due to the possible increase in swelling from the increase in activity level.    Sarthak is progressing well towards her goals.     Pt prognosis is  Good.     Pt will continue to benefit from skilled outpatient physical therapy to address the deficits listed in the problem list box on initial evaluation, provide pt/family education and to maximize pt's level of independence in the home and community environment.     Pt's spiritual, cultural and educational needs considered and pt agreeable to plan of care and goals.    Anticipated barriers to physical therapy: None identified at eval    Goals:  Short Term Goals: 6 weeks        Goal Status    1. Pt. to report decreased left ankle pain </ = 6/10 at worst to increase tolerance for WB  Progressing   6/27/2022     2. Pt. to demonstrate increased left  ankle gastrocnemius flexibility to -3 degrees to improve ease with stair descent. Progressing   6/27/2022      3. Pt to demonstrate increased MMT for left  ankle Inversion  and Eversion  to 3/5 to increase ankle stability during lateral motions. Progressing   6/27/2022      4. Pt. demonstrate increased MMT for left  ankle plantarflexion to 3+/5 to increase ease with toe clearance Progressing   6/27/2022      5. Pt to be Independent with HEP to improve ROM and independence with ADL's Progressing   6/27/2022                                   Long Term Goals: 12 weeks        Goal   Status   1. Pt. to report decreased left ankle pain </ = 3/10 at worst to increase tolerance for ambulation     2. Pt. to demonstrate increased left  ankle gastrocnemius flexibility to 5 degrees to improve ease with stair descent.     3. Pt to demonstrate increased MMT for left  ankle Inversion  and Eversion  to 4-/5 to increase ankle stability during lateral motions.     4. Pt. demonstrate increased MMT for left  ankle dorsiflexion to 4-/5 to increase ease with toe clearance     5. Pt to be Independent with HEP to improve ROM and independence with ADL's        Plan     Continued with current POC      Frank Berrios, PT ,  6/27/2022

## 2022-06-28 ENCOUNTER — HOSPITAL ENCOUNTER (OUTPATIENT)
Dept: RADIOLOGY | Facility: HOSPITAL | Age: 57
Discharge: HOME | End: 2022-06-28
Attending: PHYSICIAN ASSISTANT
Payer: COMMERCIAL

## 2022-06-28 ENCOUNTER — OFFICE VISIT (OUTPATIENT)
Dept: ORTHOPEDICS | Facility: CLINIC | Age: 57
End: 2022-06-28
Payer: COMMERCIAL

## 2022-06-28 DIAGNOSIS — S82.852D CLOSED DISPLACED TRIMALLEOLAR FRACTURE OF LEFT ANKLE WITH ROUTINE HEALING, SUBSEQUENT ENCOUNTER: Primary | ICD-10-CM

## 2022-06-28 DIAGNOSIS — R52 PAIN: ICD-10-CM

## 2022-06-28 DIAGNOSIS — S82.852D CLOSED DISPLACED TRIMALLEOLAR FRACTURE OF LEFT ANKLE WITH ROUTINE HEALING, SUBSEQUENT ENCOUNTER: ICD-10-CM

## 2022-06-28 PROCEDURE — 73610 X-RAY EXAM OF ANKLE: CPT | Mod: 26,LT,, | Performed by: RADIOLOGY

## 2022-06-28 PROCEDURE — 73610 X-RAY EXAM OF ANKLE: CPT | Mod: TC,LT

## 2022-06-28 PROCEDURE — 99999 PR PBB SHADOW E&M-EST. PATIENT-LVL II: ICD-10-PCS | Mod: PBBFAC,,, | Performed by: PHYSICIAN ASSISTANT

## 2022-06-28 PROCEDURE — 73610 XR ANKLE COMPLETE 3 VIEW LEFT: ICD-10-PCS | Mod: 26,LT,, | Performed by: RADIOLOGY

## 2022-06-28 PROCEDURE — 99999 PR PBB SHADOW E&M-EST. PATIENT-LVL II: CPT | Mod: PBBFAC,,, | Performed by: PHYSICIAN ASSISTANT

## 2022-06-28 PROCEDURE — 99024 POSTOP FOLLOW-UP VISIT: CPT | Mod: S$GLB,,, | Performed by: PHYSICIAN ASSISTANT

## 2022-06-28 PROCEDURE — 73630 X-RAY EXAM OF FOOT: CPT | Mod: 26,LT,, | Performed by: RADIOLOGY

## 2022-06-28 PROCEDURE — 73630 X-RAY EXAM OF FOOT: CPT | Mod: TC,LT

## 2022-06-28 PROCEDURE — 73630 XR FOOT COMPLETE 3 VIEW LEFT: ICD-10-PCS | Mod: 26,LT,, | Performed by: RADIOLOGY

## 2022-06-28 PROCEDURE — 99024 PR POST-OP FOLLOW-UP VISIT: ICD-10-PCS | Mod: S$GLB,,, | Performed by: PHYSICIAN ASSISTANT

## 2022-06-28 NOTE — PROGRESS NOTES
Principal Orthopedic Problem: left trimal ankle fracture                                                        Left 2,3,4 metatarsal fracture                                                          Right ankle sprain.      Relevant Medical History: according to chart     PMHX: HTN     Lives at home with family  Prior to injury  Independent community ambulator, gait aids   Denies history of stroke, heart attack, cancer, blood clot, diabetes  Denies tobacco use  Denied chronic pain medication use prior to injury  Allergic to tylenol and hx of GI bleed 2019 need to avoid NSAID.     HPI     Patient is a 56 year old female who presented to the ED on 04/27/22 with left ankle pain after injuring her bilateral ankle.   RADS:   RIGHT: no fracture or dislocation  LEFT ANKLE: trimalleolar ankle fracture  LEFT FOOT: 2,3,4 metatarsal fracture.   Patient left ankle was treated in a posterior splint with stirup.   05/17/2022 - : ORIF left ankle fracture    Ms. Branham is here today for a post-operative visit    Interval History:  she reports that she is doing ok.   she is at her sisters home. she is  participating in PT/OT.   Pain is controlled.  she is not taking pain medication.    she denies fever, chills, and sweats since the time of the surgery.     Complications: extensive blistering prior to surgery     Physical exam:arrivd to clinic on a knee scooter with boot in place accompanied by family member. .  Incision is clean, dry and intact.   Healing well no signs of breakdown or infection.    RADS: All pertinent images were reviewed by myself:   ANKLE: Patient has had surgery for a previous trimalleolar fracture.  Alignment of the fractures is excellent.  Callus formation is developing.  Bones are demineralized.    FOOT: Fractures of the base of the 2nd 3rd and 4th metatarsals remain in good position bones are demineralized.  Degenerative changes seen at the 1st MP joint and internal fixation is seen in the  ankle.    Assessment:  Post-op visit ( 6 weeks)    56F, fall 4.27.22  L trimal ankle fx  L 2,3,4 MT base fxs     05/17/2022 - ORIF left ankle fracture    Plan:  Current care, treatment plan, precautions, activity level/ modifications, limitations, rehabilitation exercises and proposed future treatment were discussed with the patient. We discussed the need to monitor for changes in symptoms and condition and report them to the physician.  Discussed importance of compliance with all appointments and follow up examinations.     WOUND CARE ORDERS  - You may use vitamin E (break the capsule open), aloe, scar cream (mederma) or hand lotion to rub the scar.  You must rub across the scar and in the line of the scar.  The goal is to make the scar not tender and make the scar not adhere (stick to) the tissues below the scar.        ACTIVITY:   -PT/OT, [facility], Patient is responsible to establish and continue care   -range of motion as tolerated    - weight bearing as tolerated  - ok to begin to wean from CAM boot as pain allows.      PAIN MEDICATION:   - Multimodal pain control  - Pain medication: refill was not needed  - Pain medication refill policy provided to patient for review, yes.    - Patient was informed a multi-modal approach is used to treat their pain. With the goal to get off of narcotic pain medication and discontinue as soon as possible.   - ice and elevation to reduce pain and swelling     DVT PROPHYLAXIS:   - Mobility for DVT prophylaxis (bleeding risk w/ ASA)     FOLLOW UP:   - Patient will follow up in the clinic in 6 weeks.  - X-ray of her ankle OOB NWB is needed.      If there are any questions prior to scheduled follow up, the patient was instructed to contact the office      Patient is returning home to [place] on 08/04/22. She has established care with an orthopedist and will obtain all records prior to leaving.

## 2022-06-29 ENCOUNTER — CLINICAL SUPPORT (OUTPATIENT)
Dept: REHABILITATION | Facility: HOSPITAL | Age: 57
End: 2022-06-29
Payer: COMMERCIAL

## 2022-06-29 DIAGNOSIS — M25.672 ANKLE STIFFNESS, LEFT: ICD-10-CM

## 2022-06-29 DIAGNOSIS — M25.572 ACUTE LEFT ANKLE PAIN: Primary | ICD-10-CM

## 2022-06-29 PROCEDURE — 97116 GAIT TRAINING THERAPY: CPT | Mod: PN,CQ

## 2022-06-29 PROCEDURE — 97110 THERAPEUTIC EXERCISES: CPT | Mod: PN,CQ

## 2022-06-29 NOTE — PROGRESS NOTES
"Daily Physical Therapy Treatment Note    Name: Sarthak Branham  Clinic Number: [id number]    Therapy Diagnosis:   Encounter Diagnoses   Name Primary?    Acute left ankle pain Yes    Ankle stiffness, left         Physician: Audrey Valadez PA-C  Visit Date: 2022     Physician Orders: PT Eval and Treat   Medical Diagnosis from Referral: S82.852D (ICD-10-CM) - Closed displaced trimalleolar fracture of left ankle with routine healing, subsequent encounter  Evaluation Date: 2022  Plan of Care Expiration: 22 or  Visit  Authorization Period Expiration: 23  Visit # / Visits authorized:   FOTO: Issued Visit # NP  PTA Consecutive Visits #:  Visit FOTO -  10th Visit FOTO -   D/C FOTO -     Time In: 3:18 pm  Time Out: 4:00 pm  Billable Time: 42 minutes   Non-Billable Time: 00 minutes    DATE OF PROCEDURE:  2022    6 weeks - 2022  8 weeks - 2022    PROCEDURE:              Open reduction internal fixation left trimalleolar fracture with fixation of posterior lip  Closed management of left foot metatarsal fracture, 2nd, 3rd, 4th, without manipulation    Insurance: Payor: [employer] / Plan: BCBS ALL OUT OF STATE / Product Type: PPO /     Subjective     Pt reports: Patient ambulated into the clinic in CAM boot and single point cane    She is compliant with home exercise program.    Response to previous treatment: "It was painful, I was in pain for a day"  Pre-Treatment Pain Ratin/10  Post-Treatment Pain Ratin/10  Location: Left ankle    Objective     Sarthak received therapeutic exercises to develop flexibility, range of motion, strength, Endurance for 32 minutes including:    Semi - Supine    Ankle circles CC/CW- 3 minutes  Ankle abc's - X1  Toe crunches - 3 minutes  Long sitting Hamstring stretch - 4X30 seconds  Ankle plantarflexion with red theraband, 3x10  Ankle dorsiflexion with red theraband, 3x10    Seated    Heel slides - 5 minutes  Ankle DF/PF on " wobble board, 3 minutes   Ankle inversion / eversion on wobble board, 3 minutes   Long Arc Quad with dorsiflexion, 3x10  Hip adduction 10 x 3      Standing     Weight shifts-3'    Sarthak participated in neuromuscular re-education activities to improve: activation for muscles in the ankle and foot and balance for 00 minutes. The following activities were included:    Lateral Weight shifts - 5' (only 3' performed)  Pre-gait weight shifts - 5' (attempted but challenging due to pt's reluctance to bear weight on Left foot. )      *Bold exercise performed     Sarthak participated in gait training to improve functional mobility and safety for 10  minutes, including:    Sarthak Branham ambulated 240 feet with straight cane device with independence assistance. Sarthak Branham displays antalgic gait deviation. Pt received verbal cues to improve gait deviations. Pt was Able to improve gait deviations with interventions. Patient displayed reduced stance time on Left foot due to pain and fear of weight bearing. Verbal cues given to increase stance time and step length. Patient tolerated well and maintained corrected gait, ambulating out of clinic. Height of single point canes was also corrected.      Home Exercises Provided and Patient Education Provided     Education provided:   - Continue with home exercise program ; ice ankle to reduce pain and elevate    Written Home Exercises Provided: Patient instructed to cont prior HEP.  Exercises were reviewed and Sarthak was able to demonstrate them prior to the end of the session.  Sarthak demonstrated fair  understanding of the education provided.     See EMR under Patient Instructions for exercises provided prior visit.    Assessment     Patient tolerated therapy well. She presented in a CAM boot ambulating with a shortened step length and a single point cane. Ankle active range of motion is still very limited. Height on single point cane was corrected by PTA. Gait training  provided to increase step length. Verbal cues provided, pt demonstrated understanding. Sarthak had no adverse effects with exercises or gait training and will continue to benefit from skilled therapy.     Sarthak is progressing well towards her goals.     Pt prognosis is Good.     Pt will continue to benefit from skilled outpatient physical therapy to address the deficits listed in the problem list box on initial evaluation, provide pt/family education and to maximize pt's level of independence in the home and community environment.     Pt's spiritual, cultural and educational needs considered and pt agreeable to plan of care and goals.    Anticipated barriers to physical therapy: None identified at eval    Goals:  Short Term Goals: 6 weeks        Goal Status    1. Pt. to report decreased left ankle pain </ = 6/10 at worst to increase tolerance for WB  Progressing   6/29/2022     2. Pt. to demonstrate increased left  ankle gastrocnemius flexibility to -3 degrees to improve ease with stair descent. Progressing   6/29/2022      3. Pt to demonstrate increased MMT for left  ankle Inversion  and Eversion  to 3/5 to increase ankle stability during lateral motions. Progressing   6/29/2022      4. Pt. demonstrate increased MMT for left  ankle plantarflexion to 3+/5 to increase ease with toe clearance Progressing   6/29/2022      5. Pt to be Independent with HEP to improve ROM and independence with ADL's Progressing   6/29/2022                                   Long Term Goals: 12 weeks        Goal   Status   1. Pt. to report decreased left ankle pain </ = 3/10 at worst to increase tolerance for ambulation     2. Pt. to demonstrate increased left  ankle gastrocnemius flexibility to 5 degrees to improve ease with stair descent.     3. Pt to demonstrate increased MMT for left  ankle Inversion  and Eversion  to 4-/5 to increase ankle stability during lateral motions.     4. Pt. demonstrate increased MMT for left  ankle  dorsiflexion to 4-/5 to increase ease with toe clearance     5. Pt to be Independent with HEP to improve ROM and independence with ADL's        Plan     Continued with current POC      Sergio Mendoza PTA ,  6/29/2022

## 2022-07-05 ENCOUNTER — DOCUMENTATION ONLY (OUTPATIENT)
Dept: REHABILITATION | Facility: HOSPITAL | Age: 57
End: 2022-07-05
Payer: COMMERCIAL

## 2022-07-05 NOTE — PROGRESS NOTES
30 day PT-PTA face-face discussion with Carlitos Phoenix DPT re: Name: Sarthak Branham  [facility] Number: [id number] patient status, POC, and plan for progression done .

## 2022-07-07 ENCOUNTER — CLINICAL SUPPORT (OUTPATIENT)
Dept: REHABILITATION | Facility: HOSPITAL | Age: 57
End: 2022-07-07
Payer: COMMERCIAL

## 2022-07-07 DIAGNOSIS — M25.672 ANKLE STIFFNESS, LEFT: ICD-10-CM

## 2022-07-07 DIAGNOSIS — M25.572 ACUTE LEFT ANKLE PAIN: Primary | ICD-10-CM

## 2022-07-07 PROCEDURE — 97110 THERAPEUTIC EXERCISES: CPT | Mod: PN

## 2022-07-07 NOTE — PROGRESS NOTES
"Daily Physical Therapy Treatment Note    Name: Sarthak Branham  Clinic Number: [id number]    Therapy Diagnosis:   Encounter Diagnoses   Name Primary?    Acute left ankle pain Yes    Ankle stiffness, left         Physician: Audrey Valadez PA-C  Visit Date: 2022     Physician Orders: PT Eval and Treat   Medical Diagnosis from Referral: S82.852D (ICD-10-CM) - Closed displaced trimalleolar fracture of left ankle with routine healing, subsequent encounter  Evaluation Date: 2022  Plan of Care Expiration: 22 or  Visit  Authorization Period Expiration: 2022  Visit # / Visits authorized:   FOTO: Issued Visit # NP  PTA Consecutive Visits #:  Visit FOTO -   Visit FOTO -   D/C FOTO -     Time In: 3:30 pm  Time Out: 4:00 pm  Billable Time: 30 minutes   Non-Billable Time: 00 minutes    DATE OF PROCEDURE:  2022    8 weeks - 2022    PROCEDURE:              Open reduction internal fixation left trimalleolar fracture with fixation of posterior lip  Closed management of left foot metatarsal fracture, 2nd, 3rd, 4th, without manipulation    Insurance: Payor: [employer] / Plan: BCBS ALL OUT OF STATE / Product Type: PPO /     Subjective     Pt reports: increased soreness and swelling after standing longer than usual, yesterday. Patient mentions she was on her feet for approximately one hour.    She is compliant with home exercise program.    Response to previous treatment: "It was painful, I was in pain for a day"  Pre-Treatment Pain Ratin/10  Post-Treatment Pain Ratin/10  Location: Left ankle    Objective     Sarthak received therapeutic exercises to develop flexibility, range of motion, strength, Endurance for 30 minutes including:    Semi - Supine    Ankle circles CC/CW- 3 minutes  Ankle abc's - X1  Ankle plantarflexion with red theraband, 3x10  Ankle dorsiflexion with red theraband, 3x10    Seated    Heel slides - 5 minutes  Ankle DF/PF on wobble board, 3 " minutes   Ankle inversion / eversion on wobble board, 5 minutes   Long Arc Quad with dorsiflexion, 3x10  Hip adduction 10 x 3  Toe crunches - 3 minutes  Calf stretch with strap, 4x30 seconds  Long sitting Hamstring stretch - 4X30 seconds      Standing     Weight shifts-3'    Sarthak participated in neuromuscular re-education activities to improve: activation for muscles in the ankle and foot and balance for 00 minutes. The following activities were included:    Lateral Weight shifts - 5' (only 3' performed)  Pre-gait weight shifts - 5' (attempted but challenging due to pt's reluctance to bear weight on Left foot. )      *Bold exercise performed     Sarthak participated in gait training to improve functional mobility and safety for 00 minutes, including:    Sarthak Branham ambulated 240 feet with straight cane device with independence assistance. Sarthak Branham displays antalgic gait deviation. Pt received verbal cues to improve gait deviations. Pt was Able to improve gait deviations with interventions. Patient displayed reduced stance time on Left foot due to pain and fear of weight bearing. Verbal cues given to increase stance time and step length. Patient tolerated well and maintained corrected gait, ambulating out of clinic. Height of single point canes was also corrected.      Home Exercises Provided and Patient Education Provided     Education provided:   - Continue with home exercise program ; ice ankle to reduce pain and elevate    Written Home Exercises Provided: Patient instructed to cont prior HEP.  Exercises were reviewed and Sarthak was able to demonstrate them prior to the end of the session.  Sarthak demonstrated fair  understanding of the education provided.     See EMR under Patient Instructions for exercises provided prior visit.    Assessment     Patient is currently 7 weeks post-op ORIF of the Left ankle.  Pt required an increase in verbal and tactile cueing during today's treatment session for  proper form and pacing of exercises.  Pt tolerated treatment session good  and will continue to benefit from skilled therapy to address deficits.  Pt performed today's therapeutic interventions without adverse events.  Pt educated to continue HEP to improve progression.      Patient continues to have difficulty weightbearing without pain in the CAM boot. Instructed to patient to slowly increase time spent weightbearing daily in CAM boot and ice/elevated as need to manage swelling.     Sarthak is progressing well towards her goals.     Pt prognosis is Good.     Pt will continue to benefit from skilled outpatient physical therapy to address the deficits listed in the problem list box on initial evaluation, provide pt/family education and to maximize pt's level of independence in the home and community environment.     Pt's spiritual, cultural and educational needs considered and pt agreeable to plan of care and goals.    Anticipated barriers to physical therapy: None identified at eval    Goals:  Short Term Goals: 6 weeks        Goal Status    1. Pt. to report decreased left ankle pain </ = 6/10 at worst to increase tolerance for WB  Progressing   7/7/2022     2. Pt. to demonstrate increased left  ankle gastrocnemius flexibility to -3 degrees to improve ease with stair descent. Progressing   7/7/2022      3. Pt to demonstrate increased MMT for left  ankle Inversion  and Eversion  to 3/5 to increase ankle stability during lateral motions. Progressing   7/7/2022      4. Pt. demonstrate increased MMT for left  ankle plantarflexion to 3+/5 to increase ease with toe clearance Progressing   7/7/2022      5. Pt to be Independent with HEP to improve ROM and independence with ADL's Progressing   7/7/2022                                   Long Term Goals: 12 weeks        Goal   Status   1. Pt. to report decreased left ankle pain </ = 3/10 at worst to increase tolerance for ambulation     2. Pt. to demonstrate increased left   ankle gastrocnemius flexibility to 5 degrees to improve ease with stair descent.     3. Pt to demonstrate increased MMT for left  ankle Inversion  and Eversion  to 4-/5 to increase ankle stability during lateral motions.     4. Pt. demonstrate increased MMT for left  ankle dorsiflexion to 4-/5 to increase ease with toe clearance     5. Pt to be Independent with HEP to improve ROM and independence with ADL's        Plan     Continued with current POC      Frank Berrios, PT ,  7/7/2022

## 2022-07-11 ENCOUNTER — CLINICAL SUPPORT (OUTPATIENT)
Dept: REHABILITATION | Facility: HOSPITAL | Age: 57
End: 2022-07-11
Payer: COMMERCIAL

## 2022-07-11 DIAGNOSIS — M25.672 ANKLE STIFFNESS, LEFT: ICD-10-CM

## 2022-07-11 DIAGNOSIS — M25.572 LEFT ANKLE PAIN, UNSPECIFIED CHRONICITY: Primary | ICD-10-CM

## 2022-07-11 PROCEDURE — 97140 MANUAL THERAPY 1/> REGIONS: CPT | Mod: PN

## 2022-07-11 PROCEDURE — 97110 THERAPEUTIC EXERCISES: CPT | Mod: PN

## 2022-07-11 NOTE — PROGRESS NOTES
"Daily Physical Therapy Treatment Note    Name: Sarthak Branham  Clinic Number: [id number]    Therapy Diagnosis:   Encounter Diagnoses   Name Primary?    Left ankle pain, unspecified chronicity Yes    Ankle stiffness, left         Physician: Audrey Valadez PA-C  Visit Date: 2022     Physician Orders: PT Eval and Treat   Medical Diagnosis from Referral: S82.852D (ICD-10-CM) - Closed displaced trimalleolar fracture of left ankle with routine healing, subsequent encounter  Evaluation Date: 2022  Plan of Care Expiration: 22 or  Visit  Authorization Period Expiration: 2022  Visit # / Visits authorized:   FOTO: Issued Visit # NP  PTA Consecutive Visits #: 0     5th Visit FOTO -   Visit FOTO -   D/C FOTO -     Time In: 3:20 pm  Time Out: 4:00 pm  Billable Time: 40 minutes   Non-Billable Time: 00 minutes    DATE OF PROCEDURE:  2022    8 weeks - 2022    PROCEDURE:              Open reduction internal fixation left trimalleolar fracture with fixation of posterior lip  Closed management of left foot metatarsal fracture, 2nd, 3rd, 4th, without manipulation    Insurance: Payor: [employer] / Plan: BCBS ALL OUT OF STATE / Product Type: PPO /     Subjective     Pt reports: I was able to walk     She is compliant with home exercise program.    Response to previous treatment: "It was painful, I was in pain for a day"  Pre-Treatment Pain Ratin/10  Post-Treatment Pain Ratin/10  Location: Left ankle    Objective     Sarthak received therapeutic exercises to develop flexibility, range of motion, strength, Endurance for 30 minutes including:    Semi - Supine    Ankle circles CC/CW- 3 minutes  Ankle abc's - X1  Ankle plantarflexion with red theraband, 3x10  Ankle dorsiflexion with red theraband, 3x10    Seated    Heel slides - 5 minutes with overpressure   Ankle DF/PF on 1/2 foam, 3 minutes   Ankle inversion / eversion on1/2 foam, 5 minutes   Long Arc Quad with dorsiflexion, " 3x10  Hip adduction 10 x 3  Toe crunches - 3 minutes  Calf stretch with strap, 4x30 seconds  Long sitting Hamstring stretch - 4X30 seconds       Standing     Weight shifts-3'    Sarthak participated in neuromuscular re-education activities to improve: activation for muscles in the ankle and foot and balance for 10 minutes. The following activities were included:    Lateral Weight shifts - 5' (only 3' performed)  Pre-gait weight shifts - 5' (attempted but challenging due to pt's reluctance to bear weight on Left foot. )      *Bold exercise performed     Sarthak participated in gait training to improve functional mobility and safety for 00 minutes, including:    Sarthak Branham ambulated 240 feet with straight cane device with independence assistance. Sarthak Branham displays antalgic gait deviation. Pt received verbal cues to improve gait deviations. Pt was Able to improve gait deviations with interventions. Patient displayed reduced stance time on Left foot due to pain and fear of weight bearing. Verbal cues given to increase stance time and step length. Patient tolerated well and maintained corrected gait, ambulating out of clinic. Height of single point canes was also corrected.      Home Exercises Provided and Patient Education Provided     Education provided:   - Continue with home exercise program ; ice ankle to reduce pain and elevate    Written Home Exercises Provided: Patient instructed to cont prior HEP.  Exercises were reviewed and Sarthak was able to demonstrate them prior to the end of the session.  Sarthak demonstrated fair  understanding of the education provided.     See EMR under Patient Instructions for exercises provided prior visit.    Assessment     Patient is currently 7 weeks post-op ORIF of the Left ankle.  Pt required an increase in verbal and tactile cueing during today's treatment session for proper form and pacing of exercises.  Pt tolerated treatment session good  and will continue to  benefit from skilled therapy to address deficits.  Pt performed today's therapeutic interventions without adverse events.  Pt educated to continue HEP to improve progression.        Patient continues to presents to therapy with straight cane and CAM boot donned. Patient tolerated an increase in manual therapy to improve joint mobility.  Pt tolerated treatment session good  and will continue to benefit from skilled therapy to address deficits.  Pt performed today's therapeutic interventions without adverse events.  Pt educated to continue HEP to improve progression and to patient to slowly increase time spent weightbearing daily in CAM boot and ice/elevated as need to manage swelling. .        Sarthak is progressing well towards her goals.     Pt prognosis is Good.     Pt will continue to benefit from skilled outpatient physical therapy to address the deficits listed in the problem list box on initial evaluation, provide pt/family education and to maximize pt's level of independence in the home and community environment.     Pt's spiritual, cultural and educational needs considered and pt agreeable to plan of care and goals.    Anticipated barriers to physical therapy: None identified at eval    Goals:  Short Term Goals: 6 weeks        Goal Status    1. Pt. to report decreased left ankle pain </ = 6/10 at worst to increase tolerance for WB  Progressing   7/11/2022     2. Pt. to demonstrate increased left  ankle gastrocnemius flexibility to -3 degrees to improve ease with stair descent. Progressing   7/11/2022      3. Pt to demonstrate increased MMT for left  ankle Inversion  and Eversion  to 3/5 to increase ankle stability during lateral motions. Progressing   7/11/2022      4. Pt. demonstrate increased MMT for left  ankle plantarflexion to 3+/5 to increase ease with toe clearance Progressing   7/11/2022      5. Pt to be Independent with HEP to improve ROM and independence with ADL's Progressing   7/11/2022                                    Long Term Goals: 12 weeks        Goal   Status   1. Pt. to report decreased left ankle pain </ = 3/10 at worst to increase tolerance for ambulation     2. Pt. to demonstrate increased left  ankle gastrocnemius flexibility to 5 degrees to improve ease with stair descent.     3. Pt to demonstrate increased MMT for left  ankle Inversion  and Eversion  to 4-/5 to increase ankle stability during lateral motions.     4. Pt. demonstrate increased MMT for left  ankle dorsiflexion to 4-/5 to increase ease with toe clearance     5. Pt to be Independent with HEP to improve ROM and independence with ADL's        Plan     Continued with current POC      Carlitos Phoenix, PT ,  7/12/2022

## 2022-08-10 ENCOUNTER — TELEPHONE (OUTPATIENT)
Dept: ORTHOPEDICS | Facility: CLINIC | Age: 57
End: 2022-08-10
Payer: COMMERCIAL

## 2022-08-10 NOTE — TELEPHONE ENCOUNTER
----- Message from Michaela Mcdonald sent at 8/10/2022 11:24 AM CDT -----  Contact: pt  Pt requesting call back re: r/s canceled post-op appt rom today. Pt will be leaving to go back home on 8-27 and needs to be seen one last time to get discharged and get paperwork to bring back to her doctor at home. Pt also states she can only come in on a Tuesday or Wednesday.     Confirmed contact below:  Contact Name:Sarthak Branham  Phone Number: [phone]

## 2022-08-12 ENCOUNTER — DOCUMENTATION ONLY (OUTPATIENT)
Dept: REHABILITATION | Facility: HOSPITAL | Age: 57
End: 2022-08-12
Payer: COMMERCIAL

## 2022-08-12 NOTE — PROGRESS NOTES
30 day PT-PTA face-face discussion with Carlitos Phoenix DPT re: Name: Sarthak Branham  [facility] Number: [id number] patient status, POC, and plan for progression done .

## 2022-08-17 ENCOUNTER — OFFICE VISIT (OUTPATIENT)
Dept: ORTHOPEDICS | Facility: CLINIC | Age: 57
End: 2022-08-17
Payer: COMMERCIAL

## 2022-08-17 ENCOUNTER — HOSPITAL ENCOUNTER (OUTPATIENT)
Dept: RADIOLOGY | Facility: HOSPITAL | Age: 57
Discharge: HOME | End: 2022-08-17
Attending: PHYSICIAN ASSISTANT
Payer: COMMERCIAL

## 2022-08-17 DIAGNOSIS — S82.852D CLOSED DISPLACED TRIMALLEOLAR FRACTURE OF LEFT ANKLE WITH ROUTINE HEALING, SUBSEQUENT ENCOUNTER: ICD-10-CM

## 2022-08-17 DIAGNOSIS — S82.852D CLOSED DISPLACED TRIMALLEOLAR FRACTURE OF LEFT ANKLE WITH ROUTINE HEALING, SUBSEQUENT ENCOUNTER: Primary | ICD-10-CM

## 2022-08-17 PROCEDURE — 99024 POSTOP FOLLOW-UP VISIT: CPT | Mod: S$GLB,,, | Performed by: PHYSICIAN ASSISTANT

## 2022-08-17 PROCEDURE — 73610 X-RAY EXAM OF ANKLE: CPT | Mod: 26,LT,, | Performed by: RADIOLOGY

## 2022-08-17 PROCEDURE — 99999 PR PBB SHADOW E&M-EST. PATIENT-LVL III: CPT | Mod: PBBFAC,,, | Performed by: PHYSICIAN ASSISTANT

## 2022-08-17 PROCEDURE — 73610 XR ANKLE COMPLETE 3 VIEW LEFT: ICD-10-PCS | Mod: 26,LT,, | Performed by: RADIOLOGY

## 2022-08-17 PROCEDURE — 1159F MED LIST DOCD IN RCRD: CPT | Mod: CPTII,S$GLB,, | Performed by: PHYSICIAN ASSISTANT

## 2022-08-17 PROCEDURE — 99024 PR POST-OP FOLLOW-UP VISIT: ICD-10-PCS | Mod: S$GLB,,, | Performed by: PHYSICIAN ASSISTANT

## 2022-08-17 PROCEDURE — 99999 PR PBB SHADOW E&M-EST. PATIENT-LVL III: ICD-10-PCS | Mod: PBBFAC,,, | Performed by: PHYSICIAN ASSISTANT

## 2022-08-17 PROCEDURE — 73610 X-RAY EXAM OF ANKLE: CPT | Mod: TC,LT

## 2022-08-17 PROCEDURE — 1159F PR MEDICATION LIST DOCUMENTED IN MEDICAL RECORD: ICD-10-PCS | Mod: CPTII,S$GLB,, | Performed by: PHYSICIAN ASSISTANT

## 2022-08-17 NOTE — PROGRESS NOTES
Principal Orthopedic Problem: left trimal ankle fracture                                                        Left 2,3,4 metatarsal fracture                                                          Right ankle sprain.      Relevant Medical History: according to chart     PMHX: HTN     Lives at home with family  Prior to injury  Independent community ambulator, gait aids   Denies history of stroke, heart attack, cancer, blood clot, diabetes  Denies tobacco use  Denied chronic pain medication use prior to injury  Allergic to tylenol and hx of GI bleed 2019 need to avoid NSAID.     HPI     Patient is a 56 year old female who presented to the ED on 04/27/22 with left ankle pain after injuring her bilateral ankle.   RADS:   RIGHT: no fracture or dislocation  LEFT ANKLE: trimalleolar ankle fracture  LEFT FOOT: 2,3,4 metatarsal fracture.   Patient left ankle was treated in a posterior splint with stirup.   05/17/2022 - : ORIF left ankle fracture    Ms. Branham is here today for a post-operative visit    Interval History:  she reports that she is doing ok.   she is at home. she is  participating in PT/OT. In regular shoe using cane as needed. Will use boot for longer distance   Pain is controlled.  she is not taking pain medication.    she denies fever, chills, and sweats since the time of the surgery.     Complications: extensive blistering prior to surgery     Physical exam:walked into clinic in boot with cane in hand.  unaccompanied.  Incision is clean, dry and intact.   Healing well no signs of breakdown or infection. PF/DF 20, 15, inversion/ eversion 5/ 10     RADS: All pertinent images were reviewed by myself:   ANKLE: Patient has had surgery for a previous trimalleolar fracture.  Alignment of the fractures is excellent.  Callus formation is developing.  Bones are demineralized.    Assessment:  Post-op visit ( 12 weeks)    56F, fall 4.27.22  L trimal ankle fx  L 2,3,4 MT base fxs     05/17/2022 - ORIF  left ankle fracture    Plan:  Current care, treatment plan, precautions, activity level/ modifications, limitations, rehabilitation exercises and proposed future treatment were discussed with the patient. We discussed the need to monitor for changes in symptoms and condition and report them to the physician.  Discussed importance of compliance with all appointments and follow up examinations.     WOUND CARE ORDERS  - You may use vitamin E (break the capsule open), aloe, scar cream (mederma) or hand lotion to rub the scar.  You must rub across the scar and in the line of the scar.  The goal is to make the scar not tender and make the scar not adhere (stick to) the tissues below the scar.        ACTIVITY:   -PT/OT, [facility], Patient is responsible to establish and continue care   -range of motion as tolerated    - weight bearing as tolerated  - ok to begin to wean from CAM boot as pain allows. Encouraged to aid in range of motion.      PAIN MEDICATION:   - Multimodal pain control  - Pain medication: refill was not needed  - Pain medication refill policy provided to patient for review, yes.    - Patient was informed a multi-modal approach is used to treat their pain. With the goal to get off of narcotic pain medication and discontinue as soon as possible.   - ice and elevation to reduce pain and swelling     DVT PROPHYLAXIS:   - Mobility for DVT prophylaxis (bleeding risk w/ ASA)     FOLLOW UP:   - Patient will follow up in the clinic in 12 weeks.  - X-ray of her ankle OOB NWB is needed.      If there are any questions prior to scheduled follow up, the patient was instructed to contact the office      Patient is returning home to [place] . She has established care with an orthopedist and will obtain all records prior to leaving.

## 2022-08-26 ENCOUNTER — PATIENT MESSAGE (OUTPATIENT)
Dept: ORTHOPEDICS | Facility: CLINIC | Age: 57
End: 2022-08-26
Payer: COMMERCIAL

## 2022-09-08 ENCOUNTER — PATIENT MESSAGE (OUTPATIENT)
Dept: ORTHOPEDICS | Facility: CLINIC | Age: 57
End: 2022-09-08
Payer: COMMERCIAL

## (undated) DEVICE — STOCKINET 4INX48

## (undated) DEVICE — CATH SUCTION 10FR

## (undated) DEVICE — GAUZE SPONGE 4X4 12PLY

## (undated) DEVICE — PAD CAST SPECIALIST STRL 6

## (undated) DEVICE — DRAPE C-ARMOR EQUIPMENT COVER

## (undated) DEVICE — TAPE SURG DURAPORE 2 X10YD

## (undated) DEVICE — TRAY MINOR ORTHO

## (undated) DEVICE — DRESSING GAUZE XEROFORM 5X9

## (undated) DEVICE — SUT VICRYL PLUS 2-0 CT1 18

## (undated) DEVICE — MASK FLYTE HOOD PEEL AWAY

## (undated) DEVICE — BNDG COFLEX FOAM LF2 ST 4X5YD

## (undated) DEVICE — TOURNIQUET SB QC DP 34X4IN

## (undated) DEVICE — APPLICATOR CHLORAPREP ORN 26ML

## (undated) DEVICE — ELECTRODE REM PLYHSV RETURN 9

## (undated) DEVICE — SUT VICRYL PLUS 3-0 SH 18IN

## (undated) DEVICE — BANDAGE ESMARK 6X12

## (undated) DEVICE — SEE MEDLINE ITEM 157150

## (undated) DEVICE — BANDAGE ACE ELASTIC 6"

## (undated) DEVICE — DRAPE C ARM 42 X 120 10/BX

## (undated) DEVICE — Device

## (undated) DEVICE — GUIDEWIRE UT 1.4X150MM
Type: IMPLANTABLE DEVICE | Site: ANKLE | Status: NON-FUNCTIONAL
Removed: 2022-05-17

## (undated) DEVICE — TOWEL OR DISP STRL BLUE 4/PK

## (undated) DEVICE — GAUZE SPONGE 4X4 12 PLY NS

## (undated) DEVICE — SEE MEDLINE ITEM 146298

## (undated) DEVICE — DRAPE PLASTIC U 60X72

## (undated) DEVICE — SEE MEDLINE ITEM 157131

## (undated) DEVICE — SPLINT PLASTER FS 5INX45IN

## (undated) DEVICE — SUT ETHILON 3/0 18IN PS-1

## (undated) DEVICE — TIP YANKAUERS BULB NO VENT

## (undated) DEVICE — BIT DRILL CALIBRATED 2.5MM

## (undated) DEVICE — DRAPE STERI U-SHAPED 47X51IN

## (undated) DEVICE — BLADE #15 STERILE CARBON